# Patient Record
Sex: FEMALE | Race: WHITE | NOT HISPANIC OR LATINO | Employment: UNEMPLOYED | URBAN - METROPOLITAN AREA
[De-identification: names, ages, dates, MRNs, and addresses within clinical notes are randomized per-mention and may not be internally consistent; named-entity substitution may affect disease eponyms.]

---

## 2017-06-09 ENCOUNTER — APPOINTMENT (OUTPATIENT)
Dept: WOMENS IMAGING | Facility: HOSPITAL | Age: 57
End: 2017-06-09

## 2017-06-09 PROCEDURE — 77063 BREAST TOMOSYNTHESIS BI: CPT | Performed by: RADIOLOGY

## 2017-06-09 PROCEDURE — 77067 SCR MAMMO BI INCL CAD: CPT | Performed by: RADIOLOGY

## 2018-06-12 ENCOUNTER — APPOINTMENT (OUTPATIENT)
Dept: WOMENS IMAGING | Facility: HOSPITAL | Age: 58
End: 2018-06-12

## 2018-06-12 PROCEDURE — 77067 SCR MAMMO BI INCL CAD: CPT | Performed by: RADIOLOGY

## 2018-06-12 PROCEDURE — 77063 BREAST TOMOSYNTHESIS BI: CPT | Performed by: RADIOLOGY

## 2021-01-14 ENCOUNTER — APPOINTMENT (OUTPATIENT)
Dept: WOMENS IMAGING | Facility: HOSPITAL | Age: 61
End: 2021-01-14

## 2021-01-14 PROCEDURE — 77063 BREAST TOMOSYNTHESIS BI: CPT | Performed by: RADIOLOGY

## 2021-01-14 PROCEDURE — 77067 SCR MAMMO BI INCL CAD: CPT | Performed by: RADIOLOGY

## 2021-05-25 ENCOUNTER — OFFICE VISIT CONVERTED (OUTPATIENT)
Dept: FAMILY MEDICINE CLINIC | Age: 61
End: 2021-05-25
Attending: NURSE PRACTITIONER

## 2021-06-04 ENCOUNTER — HOSPITAL ENCOUNTER (OUTPATIENT)
Dept: OTHER | Facility: HOSPITAL | Age: 61
Discharge: HOME | End: 2021-06-04
Attending: NURSE PRACTITIONER

## 2021-06-04 LAB
25(OH)D3 SERPL-MCNC: 35.4 NG/ML (ref 30–100)
ALBUMIN SERPL-MCNC: 4.8 G/DL (ref 3.5–5)
ALBUMIN/GLOB SERPL: 1.6 {RATIO} (ref 1.4–2.6)
ALP SERPL-CCNC: 31 U/L (ref 53–141)
ALT SERPL-CCNC: 20 U/L (ref 10–40)
ANION GAP SERPL CALC-SCNC: 17 MMOL/L (ref 8–19)
AST SERPL-CCNC: 18 U/L (ref 15–50)
BASOPHILS # BLD MANUAL: 0.04 10*3/UL (ref 0–0.2)
BASOPHILS NFR BLD MANUAL: 0.7 % (ref 0–3)
BILIRUB SERPL-MCNC: 0.2 MG/DL (ref 0.2–1.3)
BUN SERPL-MCNC: 13 MG/DL (ref 5–25)
BUN/CREAT SERPL: 19 {RATIO} (ref 6–20)
CALCIUM SERPL-MCNC: 9.5 MG/DL (ref 8.7–10.4)
CHLORIDE SERPL-SCNC: 102 MMOL/L (ref 99–111)
CHOLEST SERPL-MCNC: 230 MG/DL (ref 107–200)
CHOLEST/HDLC SERPL: 3.5 {RATIO} (ref 3–6)
CONV CO2: 27 MMOL/L (ref 22–32)
CONV TOTAL PROTEIN: 7.8 G/DL (ref 6.3–8.2)
CREAT UR-MCNC: 0.68 MG/DL (ref 0.5–0.9)
DEPRECATED RDW RBC AUTO: 41.6 FL
EOSINOPHIL # BLD MANUAL: 0.13 10*3/UL (ref 0–0.7)
EOSINOPHIL NFR BLD MANUAL: 2.2 % (ref 0–7)
ERYTHROCYTE [DISTWIDTH] IN BLOOD BY AUTOMATED COUNT: 13.9 % (ref 11.5–14.5)
GFR SERPLBLD BASED ON 1.73 SQ M-ARVRAT: >60 ML/MIN/{1.73_M2}
GLOBULIN UR ELPH-MCNC: 3 G/DL (ref 2–3.5)
GLUCOSE SERPL-MCNC: 101 MG/DL (ref 65–99)
GRANS (ABSOLUTE): 3.22 10*3/UL (ref 2–8)
GRANS: 54.8 % (ref 30–85)
HBA1C MFR BLD: 12.9 G/DL (ref 12–16)
HCT VFR BLD AUTO: 40 % (ref 37–47)
HDLC SERPL-MCNC: 66 MG/DL (ref 40–60)
IMM GRANULOCYTES # BLD: 0.01 10*3/UL (ref 0–0.54)
IMM GRANULOCYTES NFR BLD: 0.2 % (ref 0–0.43)
LDLC SERPL CALC-MCNC: 144 MG/DL (ref 70–100)
LYMPHOCYTES # BLD MANUAL: 1.96 10*3/UL (ref 1–5)
LYMPHOCYTES NFR BLD MANUAL: 8.8 % (ref 3–10)
MCH RBC QN AUTO: 26.3 PG (ref 27–31)
MCHC RBC AUTO-ENTMCNC: 32.3 G/DL (ref 33–37)
MCV RBC AUTO: 81.5 FL (ref 81–99)
MONOCYTES # BLD AUTO: 0.52 10*3/UL (ref 0.2–1.2)
OSMOLALITY SERPL CALC.SUM OF ELEC: 294 MOSM/KG (ref 273–304)
PLATELET # BLD AUTO: 181 10*3/UL (ref 130–400)
PMV BLD AUTO: 11.6 FL (ref 7.4–10.4)
POTASSIUM SERPL-SCNC: 4 MMOL/L (ref 3.5–5.3)
RBC # BLD AUTO: 4.91 10*6/UL (ref 4.2–5.4)
SODIUM SERPL-SCNC: 142 MMOL/L (ref 135–147)
TRIGL SERPL-MCNC: 98 MG/DL (ref 40–150)
TSH SERPL-ACNC: 2.42 M[IU]/L (ref 0.27–4.2)
VARIANT LYMPHS NFR BLD MANUAL: 33.3 % (ref 20–45)
VIT B12 SERPL-MCNC: 431 PG/ML (ref 211–911)
VLDLC SERPL-MCNC: 20 MG/DL (ref 5–37)
WBC # BLD AUTO: 5.88 10*3/UL (ref 4.8–10.8)

## 2021-06-05 LAB — HCV AB SER DONR QL: <0.1 S/CO RATIO (ref 0–0.9)

## 2021-06-05 NOTE — PROGRESS NOTES
Mine Palm  1960     Office/Outpatient Visit    Visit Date: Tue, May 25, 2021 02:12 pm    Provider: Janet Canseco N.P. (Assistant: Desiree Scott MA)    Location: [facility]        Electronically signed by Janet Canseco N.P. on  05/25/2021 06:35:16 PM                             Subjective:        CC: Mine is a 60 year old female.  This is her first visit to the clinic.  med refills, establish care, lab work (not fasting);         HPI: moved here from [place] recently but has not been to PCP for some time          PHQ-9 Depression Screening: Completed form scanned and in chart; Total Score 6           Complaint of vitamin D deficiency, unspecified..  The symptom began years ago.  The severity is of moderate intensity.  previous Vitamin D deficieny taking daily supplement  has not had values checked for over a year           Concerning irritable bowel syndrome with constipation, had colon and EGD / CT abdomen 5 years ago  -   Has an appt in June with Gastro for follow up   [place]  Symptoms come and go and wax and wane in severity.  She does take fiber supplement she does take IB Tio PRN which seems to help  has tried multiple dietary chagnes over the years  no improvement in her symptoms           Complaint of menopausal and female climacteric states..  The symptom began years ago.  currently taking hormone replacement           Complaint of herpesviral vesicular dermatitis..  will get recurrent cold sores  generally likes to have acyclovir on hand in case develops     ROS:     CONSTITUTIONAL:  Negative for chills, fatigue and fever.      CARDIOVASCULAR:  Negative for chest pain and pedal edema.      RESPIRATORY:  Negative for recent cough and dyspnea.      GASTROINTESTINAL:  Positive for abdominal pain ( upper abdomen more bloating / discomfort ), acid reflux symptoms, abdominal bloating, constipation and belching.   Negative for diarrhea, heartburn, nausea or  vomiting.      MUSCULOSKELETAL:  Negative for arthralgias and myalgias.      INTEGUMENTARY/BREAST:  Negative for pruritis and rash.      NEUROLOGICAL:  Negative for dizziness, fainting, headaches and paresthesias.      ENDOCRINE:  Negative for hair loss, polydipsia and polyphagia.      ALLERGIC/IMMUNOLOGIC:  Negative for seasonal allergies.      PSYCHIATRIC:  Negative for anxiety, depression and suicidal thoughts.          Past Medical History / Family History / Social History:         Past Medical History:             PREVENTIVE HEALTH MAINTENANCE             BONE DENSITY: was last done      COLORECTAL CANCER SCREENING: Up to date (colonoscopy q10y; sigmoidoscopy q5y; Cologuard q3y) was last done   [facility]  Dr. Cornell  - Normal     DENTAL CLEANING: was last done   [place]     EYE EXAM: was last done  right eye  - small retinal sclerosis  follow up in 6 months - [place]     Hepatitis C Medicare Screening: unsure     MAMMOGRAM: was last done   Dr. Bakre   Imaging across from [facility]         Surgical History:         Hysterectomy: at age 29 - fibroid tumor / endometriosis   Total with BLSO;     bilateral breast reduction;    adhesions removal   - ;      section: X 1;         Family History:     Father:  at age 64; Cause of death was CVA;  cerebrovascular accident     Mother:  at age 82;  congestive heart failure;  COPD;  hypothyroidism     Brother(s): coronary artery disease (in 50s or 60s open heart)     Sister(s): transient ischemic attack         Social History:     Occupation: Unemployed     Marital Status:      Children: 1 child         Tobacco/Alcohol/Supplements:     Tobacco: She has never smoked.          Alcohol: Frequency: Socially     Caffeine:  She admits to consuming caffeine via coffee.          Substance Abuse History:     None         Mental Health History:     NEGATIVE         Communicable Diseases (eg STDs):     Reportable health conditions;  NEGATIVE         Current Problems:     Herpesviral vesicular dermatitis    Vitamin D deficiency, unspecified    Irritable bowel syndrome with constipation    Menopausal and female climacteric states    Other fatigue    Encounter for screening for other viral diseases    Encounter for screening for depression    Encounter for screening for cardiovascular disorders        Immunizations:     influenza, injectable, quadrivalent, preservative free 10/8/2020    SARS-COV-2 (COVID-19) vaccine, UNSPECIFIED 3/13/2021    SARS-COV-2 (COVID-19) vaccine, UNSPECIFIED 4/3/2021        Allergies:     Last Reviewed on 5/25/2021 02:18 PM by Desiree Scott    No Known Allergies.        Current Medications:     Last Reviewed on 5/25/2021 02:18 PM by Desiree Scott    Vitamin D3 10 mcg (400 unit) oral capsule    calcium carbonate 300 mg (750 mg) oral tablet,chewable    Vitamin C     ACYCLOVIR 400MG TABLETS  [TK 1 T PO Q 8 H FOR 5 DAYS]    PROGESTERONE MICRO 100MG CAPSULES  [TAKE 1 CAPSULE BY MOUTH EVERY NIGHT AT BEDTIME]    ESTRADIOL 0.0375MG PATCH (TWICE WK)  [APPLY 1 PATCH EXTERNALLY TO THE SKIN 2 TIMES A WEEK]        Objective:        Vitals:         Current: 5/25/2021 2:23:56 PM    Ht:  5 ft, 0 in;  Wt: 196 lbs;  BMI: 38.3T: 97.4 F (temporal);  BP: 132/62 mm Hg (left arm, sitting);  P: 74 bpm (left arm (BP Cuff), sitting)        Exams:     PHYSICAL EXAM:     GENERAL: vital signs recorded - well developed, well nourished, obese;  no apparent distress;     NECK: thyroid is non-palpable;     RESPIRATORY: normal appearance and symmetric expansion of chest wall; normal respiratory rate and pattern with no distress; normal breath sounds with no rales, rhonchi, wheezes or rubs;     CARDIOVASCULAR: normal rate; rhythm is regular;  no edema;     GASTROINTESTINAL: nontender; normal bowel sounds; no organomegaly;     MUSCULOSKELETAL: normal gait; normal range of motion of all major muscle groups; no limb or joint pain with range of  motion;     NEUROLOGIC: mental status: alert and oriented x 3;     PSYCHIATRIC: appropriate affect and demeanor; normal speech pattern; normal thought and perception;         Assessment:         K58.1   Irritable bowel syndrome with constipation       E55.9   Vitamin D deficiency, unspecified       R53.83   Other fatigue       N95.1   Menopausal and female climacteric states       B00.1   Herpesviral vesicular dermatitis       Z13.31   Encounter for screening for depression       Z13.6   Encounter for screening for cardiovascular disorders       Z11.59   Encounter for screening for other viral diseases           ORDERS:         Meds Prescribed:       [New Rx] acyclovir 400 mg oral tablet [take 1 tablet (400 mg) by oral route q 8 hours x 5 days prn at onset of symptoms ], #20 (twenty) tablets, Refills: 5 (five)         Lab Orders:       15321  VITD - [facility] Vitamin D, 25 Hydroxy  (Send-Out)            17553  VB12 - [facility] Vitamin B12  (Send-Out)            80346  [facility]PCAB - [facility] Hepatitis C antibody  (Send-Out)            51243  PHYSF - [facility] PHYSICAL: CMP, CBC, TSH, LIPID: [id number], [id number], [id number], [id number]  (Send-Out)              Other Orders:         Depression screen negative  (In-House)                      Plan:         Irritable bowel syndrome with constipationKeep appt with Gastro and have records forwarded to our office         Vitamin D deficiency, unspecifiedcontinue current treatment and will check levels     LABORATORY:  Labs ordered to be performed today include Vitamin D.            Orders:       [id number]  VITD - [facility] Vitamin D, 25 Hydroxy  (Send-Out)              Other fatigue    LABORATORY:  Labs ordered to be performed today include B12.            Orders:       60804  VB12 - [facility] Vitamin B12  (Send-Out)              Menopausal and female climacteric statesI have discussed with her recommendations are generally based on CVD risk  we will check her labs and see if we feel she should continue vs alternative and  determine risks         Herpesviral vesicular dermatitiswill refill medication for her to have on hand for PRN use         Encounter for screening for depression    MIPS PHQ-9 Depression Screening: Completed form scanned and in chart; Total Score 6; Positive Depression Screen but after further evaluation the patient does not have a diagnosis of depression.            Orders:         Depression screen negative  (In-House)              Encounter for screening for cardiovascular disorders    LABORATORY:  Labs ordered to be performed today include PHYSICAL PANEL; CMP, CBC, TSH, LIPID.            Orders:       [id number]  [facility] PHYSICAL: CMP, CBC, TSH, LIPID: [id number], [id number], [id number]  (Send-Out)              Encounter for screening for other viral diseases    LABORATORY:  Labs ordered to be performed today include Hepatitis C Antibody.            Orders:       [id number]  [facility] Hepatitis C antibody  (Send-Out)                  Other Prescriptions:       [New Rx] acyclovir 400 mg oral tablet [take 1 tablet (400 mg) by oral route q 8 hours x 5 days prn at onset of symptoms ], #20 (twenty) tablets, Refills: 5 (five)         Charge Capture:         Primary Diagnosis:     K58.1  Irritable bowel syndrome with constipation           Orders:      99661  Office visit - new pt, level 3  (In-House)              E55.9  Vitamin D deficiency, unspecified     R53.83  Other fatigue     N95.1  Menopausal and female climacteric states     B00.1  Herpesviral vesicular dermatitis     Z13.31  Encounter for screening for depression           Orders:        Depression screen negative  (In-House)              Z13.6  Encounter for screening for cardiovascular disorders     Z11.59  Encounter for screening for other viral diseases         ADDENDUMS:      ____________________________________    Addendum: 05/28/2021 02:45 PM - Janet Canseco        ADDENDUM: Add [id number]; Remove [id number]

## 2021-06-17 ENCOUNTER — OFFICE VISIT (OUTPATIENT)
Dept: GASTROENTEROLOGY | Facility: CLINIC | Age: 61
End: 2021-06-17

## 2021-06-17 VITALS
BODY MASS INDEX: 38.09 KG/M2 | HEIGHT: 60 IN | WEIGHT: 194 LBS | SYSTOLIC BLOOD PRESSURE: 130 MMHG | DIASTOLIC BLOOD PRESSURE: 70 MMHG

## 2021-06-17 DIAGNOSIS — K76.0 HEPATIC STEATOSIS: Chronic | ICD-10-CM

## 2021-06-17 DIAGNOSIS — R14.2 BELCHING: Chronic | ICD-10-CM

## 2021-06-17 DIAGNOSIS — R10.13 DYSPEPSIA: Primary | Chronic | ICD-10-CM

## 2021-06-17 DIAGNOSIS — R14.0 BLOATING: Chronic | ICD-10-CM

## 2021-06-17 DIAGNOSIS — K59.09 OTHER CONSTIPATION: Chronic | ICD-10-CM

## 2021-06-17 PROCEDURE — 99204 OFFICE O/P NEW MOD 45 MIN: CPT | Performed by: INTERNAL MEDICINE

## 2021-06-17 RX ORDER — PROGESTERONE 100 MG/1
100 CAPSULE ORAL
COMMUNITY
Start: 2021-04-21

## 2021-06-17 RX ORDER — OMEPRAZOLE 20 MG/1
20 CAPSULE, DELAYED RELEASE ORAL DAILY
COMMUNITY
End: 2021-06-17

## 2021-06-17 RX ORDER — PANTOPRAZOLE SODIUM 20 MG/1
20 TABLET, DELAYED RELEASE ORAL
Qty: 30 TABLET | Refills: 3 | Status: SHIPPED | OUTPATIENT
Start: 2021-06-17 | End: 2021-11-24 | Stop reason: SDUPTHER

## 2021-06-17 RX ORDER — ESTRADIOL 0.04 MG/D
1 FILM, EXTENDED RELEASE TRANSDERMAL 2 TIMES WEEKLY
COMMUNITY
Start: 2021-04-22 | End: 2023-01-09 | Stop reason: ALTCHOICE

## 2021-06-17 NOTE — PROGRESS NOTES
"Chief Complaint   Patient presents with   • Irritable Bowel Syndrome   • Belching       Subjective     HPI    Mine Palm is a 60 y.o. female with a past medical history noted below who presents for IBS and belching.  She reports symptoms for about 5 years.  Describes belching, indigestion, lots of \"churning\" in her lower abdomen.  She saw Dr Rogers in 2017.  Had scopes 1/2017. She was diagnosed with IBS, treated witih omeprazole and bentyl but doesn't think they helped too much.    Lactose intolerant.  Struggles with beans, cruciferous veggies, processed deli meat-- \"tears me up.\"    Denies diarrhea.  If anything, more constipated, irregular BMs, feels incomplete evacuation.  Takes 1 fiber pill daily.      Negative celiac testing    Feels belching is excessive.  Having dyspepsia, bloating, mid-abdomen burning intermittently, depends on certain foods.  Taking otc omeprazole but doesn't feel that it helps.      No family history of GI malignancies    Hx of hysterectomy and  c section    Previously worked in [employer], not currently working    No smoking, social ETOH    Today's visit was in the office.  Both the patient and I were wearing face masks and proper hand hygiene was performed before and after the physical exam.           Current Outpatient Medications:   •  Ascorbic Acid (VITAMIN C PO), Take  by mouth Daily., Disp: , Rfl:   •  estradiol (VIVELLE-DOT) 0.0375 MG/24HR patch, 1 patch 2 (Two) Times a Week., Disp: , Rfl:   •  Progesterone (PROMETRIUM) 100 MG capsule, Take 100 mg by mouth every night at bedtime., Disp: , Rfl:   •  vitamin D3 125 MCG (5000 UT) capsule capsule, Take 5,000 Units by mouth Daily., Disp: , Rfl:   •  pantoprazole (PROTONIX) 20 MG EC tablet, Take 1 tablet by mouth Every Morning Before Breakfast., Disp: 30 tablet, Rfl: 3      Objective     Vitals:    06/17/21 0911   BP: 130/70         06/17/21 0911   Weight: 88 kg (194 lb)     Body mass index is 37.89 kg/m².    Physical Exam  Vitals " reviewed.   Constitutional:       General: She is not in acute distress.     Appearance: Normal appearance. She is well-developed. She is obese. She is not diaphoretic.   HENT:      Head: Normocephalic.   Neck:      Thyroid: No thyromegaly.   Cardiovascular:      Rate and Rhythm: Normal rate and regular rhythm.   Pulmonary:      Effort: Pulmonary effort is normal. No respiratory distress.      Breath sounds: Normal breath sounds. No wheezing.   Abdominal:      General: Bowel sounds are normal. There is no distension.      Palpations: Abdomen is soft. Abdomen is not rigid. There is no mass.      Tenderness: There is no abdominal tenderness. There is no guarding or rebound.      Hernia: No hernia is present.   Genitourinary:     Comments: Rectal exam deferred  Musculoskeletal:         General: No tenderness.   Neurological:      Mental Status: She is alert and oriented to person, place, and time.      Motor: No atrophy.      Coordination: Coordination normal.   Psychiatric:         Behavior: Behavior normal.         Thought Content: Thought content normal.         Judgment: Judgment normal.             WBC   Date Value Ref Range Status   06/04/2021 5.88 4.80 - 10.80 10*3/uL Final   02/13/2019 5.52 4.5 - 11.0 10*3/uL Final     RBC   Date Value Ref Range Status   06/04/2021 4.91 4.20 - 5.40 10*6/uL Final   02/13/2019 5.24 (H) 4.0 - 5.2 10*6/uL Final     Hemoglobin   Date Value Ref Range Status   02/13/2019 14.0 12.0 - 16.0 g/dL Final     Hgb   Date Value Ref Range Status   06/04/2021 12.90 12.00 - 16.00 g/dL Final     Hematocrit   Date Value Ref Range Status   06/04/2021 40.0 37.0 - 47.0 % Final   02/13/2019 42.9 36.0 - 46.0 % Final     MCV   Date Value Ref Range Status   06/04/2021 81.5 81.0 - 99.0 fL Final   02/13/2019 81.9 80.0 - 100.0 fL Final     MCH   Date Value Ref Range Status   06/04/2021 26.3 (L) 27.0 - 31.0 pg Final   02/13/2019 26.7 26.0 - 34.0 pg Final     MCHC   Date Value Ref Range Status   06/04/2021  32.3 (L) 33.0 - 37.0 Final   02/13/2019 32.6 31.0 - 37.0 g/dL Final     RDW   Date Value Ref Range Status   06/04/2021 13.9 11.5 - 14.5 % Final   02/13/2019 13.5 12.0 - 16.8 % Final     RDW-SD   Date Value Ref Range Status   06/04/2021 41.6 NA Final     MPV   Date Value Ref Range Status   06/04/2021 11.6 (H) 7.4 - 10.4 fL Final     Comment:     Testing performed by:  [facility]  CLIA#[id number]  [address] [facility]  [place] [address]     02/13/2019 12.0 (H) 6.7 - 10.8 fL Final     Platelets   Date Value Ref Range Status   06/04/2021 181.00 130.00 - 400.00 10*3/uL Final   02/13/2019 173 140 - 440 10*3/uL Final     Neutrophil Rel %   Date Value Ref Range Status   02/13/2019 56.3 45 - 80 % Final     Lymphocyte Rel %   Date Value Ref Range Status   02/13/2019 33.0 15 - 50 % Final     Monocyte Rel %   Date Value Ref Range Status   02/13/2019 7.6 0 - 15 % Final     Eosinophil %   Date Value Ref Range Status   02/13/2019 2.2 0 - 7 % Final     Basophil Rel %   Date Value Ref Range Status   02/13/2019 0.9 0 - 2 % Final     Immature Grans %   Date Value Ref Range Status   02/13/2019 0.0 0 % Final     Neutrophils Absolute   Date Value Ref Range Status   02/13/2019 3.11 2.0 - 8.8 10*3/uL Final     Lymphocytes Absolute   Date Value Ref Range Status   02/13/2019 1.82 0.7 - 5.5 10*3/uL Final     Monocytes Absolute   Date Value Ref Range Status   06/04/2021 0.52 0.20 - 1.20 10*3/uL Final   02/13/2019 0.42 0.0 - 1.7 10*3/uL Final     Eosinophils Absolute   Date Value Ref Range Status   06/04/2021 0.13 0.00 - 0.70 10*3/uL Final   02/13/2019 0.12 0.0 - 0.8 10*3/uL Final     Basophils Absolute   Date Value Ref Range Status   06/04/2021 0.04 0.00 - 0.20 10*3/uL Final   02/13/2019 0.05 0.0 - 0.2 10*3/uL Final     Immature Grans, Absolute   Date Value Ref Range Status   02/13/2019 0.00 <1 10*3/uL Final     nRBC   Date Value Ref Range Status   02/13/2019 0 0 /100(WBC) Final       Lab Results   Component  Value Date    BUN 13 06/04/2021    CREATININE 0.68 06/04/2021    BCR 19 06/04/2021    CO2 27 06/04/2021    CALCIUM 9.5 06/04/2021    ALBUMIN 4.8 06/04/2021    LABIL2 1.6 06/04/2021    AST 18 06/04/2021    ALT 20 06/04/2021         US ABDOMEN LIMITED     DATE: 01/10/2017     HISTORY: Elevated LFT's.     FINDINGS: Limited right upper quadrant ultrasound performed, which shows diffuse hepatic increased echogenicity, but there is no focal mass or biliary ductal dilatation. The gallbladder is normal and free of stones, and the common bile duct is of normal   diameter at 3 mm. Visualized portions of the right kidney and pancreas are normal. There is no free peritoneal fluid.     IMPRESSION:   1. Diffuse hepatic fatty infiltration. No other abnormality identified.     Dictated by: David Ledezma M.D.     Images and Report reviewed and interpreted by: David Ledezma M.D.       I personally reviewed data as detailed below:     The labs listed above.    The radiology studies listed above.    Office notes from: 3/5/19 and 2/2018 Dr Rogers GI notes    Endoscopy procedures and pathology from: 1/2017 EGD and colonoscopy; notable for L sided diverticulosis      No notes on file    Assessment/Plan     Diagnoses and all orders for this visit:    1. Dyspepsia (Primary)    2. Other constipation    3. Belching    4. Bloating    5. Hepatic steatosis    Other orders  -     pantoprazole (PROTONIX) 20 MG EC tablet; Take 1 tablet by mouth Every Morning Before Breakfast.  Dispense: 30 tablet; Refill: 3    Plan  This is a 60-year-old woman with multiple chronic GI issues.  I suspect the biggest underlying issue is her constipation.  I have encouraged her to use MiraLAX which she has at home.  We discussed that she should adjust the dosing and that it can make the stools softer in consistency.  I would also like to try her on pantoprazole to help with her dyspepsia symptoms  Encouraged her to continue the fiber but to make sure  she is getting the correct dosing of this because it sounds like she is underdosed on the amount she should take daily  If she is not improving with the PPI change and constipation, will look to repeat scope test    I have discussed the above plan with the patient.  They verbalize understanding and are in agreement with the plan.  They have been advised to contact the office for any questions, concerns, or changes related to their health.    Dictated utilizing Dragon dictation

## 2021-06-21 ENCOUNTER — TELEPHONE (OUTPATIENT)
Dept: FAMILY MEDICINE CLINIC | Age: 61
End: 2021-06-21

## 2021-06-21 NOTE — TELEPHONE ENCOUNTER
Caller: Mine Palm    Relationship to patient: Self    Best call back number:[phone]     Chief complaint: HURT RIGHT HAND, SWOLLEN, WEAK, LIMITED RANGE OF MOTION, PAIN    Type of visit: SAME DAY    Requested date: 06/21/2021      Additional notes:PATIENT REPORTS FALLING ON 06/19/2021.

## 2021-07-02 VITALS
TEMPERATURE: 97.4 F | HEART RATE: 74 BPM | WEIGHT: 196 LBS | BODY MASS INDEX: 38.48 KG/M2 | SYSTOLIC BLOOD PRESSURE: 132 MMHG | DIASTOLIC BLOOD PRESSURE: 62 MMHG | HEIGHT: 60 IN

## 2021-11-02 ENCOUNTER — TELEPHONE (OUTPATIENT)
Dept: GASTROENTEROLOGY | Facility: CLINIC | Age: 61
End: 2021-11-02

## 2021-11-02 ENCOUNTER — OFFICE VISIT (OUTPATIENT)
Dept: GASTROENTEROLOGY | Facility: CLINIC | Age: 61
End: 2021-11-02

## 2021-11-02 VITALS
BODY MASS INDEX: 38.4 KG/M2 | SYSTOLIC BLOOD PRESSURE: 136 MMHG | DIASTOLIC BLOOD PRESSURE: 72 MMHG | HEIGHT: 60 IN | WEIGHT: 195.6 LBS

## 2021-11-02 DIAGNOSIS — K76.0 HEPATIC STEATOSIS: ICD-10-CM

## 2021-11-02 DIAGNOSIS — R10.10 PAIN OF UPPER ABDOMEN: Chronic | ICD-10-CM

## 2021-11-02 DIAGNOSIS — R10.13 DYSPEPSIA: Primary | Chronic | ICD-10-CM

## 2021-11-02 DIAGNOSIS — R14.0 BLOATING: Chronic | ICD-10-CM

## 2021-11-02 DIAGNOSIS — K59.09 OTHER CONSTIPATION: Chronic | ICD-10-CM

## 2021-11-02 DIAGNOSIS — R14.2 BELCHING: Chronic | ICD-10-CM

## 2021-11-02 PROCEDURE — 99214 OFFICE O/P EST MOD 30 MIN: CPT | Performed by: INTERNAL MEDICINE

## 2021-11-02 RX ORDER — SODIUM CHLORIDE, SODIUM LACTATE, POTASSIUM CHLORIDE, CALCIUM CHLORIDE 600; 310; 30; 20 MG/100ML; MG/100ML; MG/100ML; MG/100ML
30 INJECTION, SOLUTION INTRAVENOUS CONTINUOUS
Status: CANCELLED | OUTPATIENT
Start: 2022-01-04

## 2021-11-02 RX ORDER — FAMOTIDINE 20 MG/1
20 TABLET, FILM COATED ORAL NIGHTLY
Qty: 30 TABLET | Refills: 0 | Status: SHIPPED | OUTPATIENT
Start: 2021-11-02 | End: 2022-02-01

## 2021-11-02 NOTE — TELEPHONE ENCOUNTER
HEIDI patient in office for EGD/CS.  Scheduled on  01/04/2022 with arrival time of 9:00am . Prep packet given to patient in office. Patient also advised that his/her arrival time may fluctuate due to [facility] guidelines. HEIDI Hooper--[place] .

## 2021-11-02 NOTE — PROGRESS NOTES
"Chief Complaint   Patient presents with   • Dyspepsia   • Constipation   • Bloated   • Belching       Subjective     HPI    Mine Palm is a 60 y.o. female with a past medical history noted below who presents for follow-up of multiple chronic GI issues including constipation, dyspepsia, hepatic steatosis.    PPI has helped her belching.  However, she still doesn't feel quite right.      Feels like something is \"stuck\" in the right side of her abdomen    Raises up and burps a lot at night and that is interrupting her sleep.  She feels that is making her not feel well due to interrupting her sleep.    Stools are hard to push out.  Stools are initially hard and then softer.  Taking Metamucil, sometimes 2 capsules sometimes 4 in a day.  Not regular with this.  Taking MiraLAX as needed.  She feels that it sort of helps some.  She did recently see some blood in her stool, she thinks this was from straining.    Takes pantoprazole every morning.      Today's visit was in the office.  Both the patient and I were wearing face masks and proper hand hygiene was performed before and after the physical exam.           Current Outpatient Medications:   •  Ascorbic Acid (VITAMIN C PO), Take  by mouth Daily., Disp: , Rfl:   •  estradiol (VIVELLE-DOT) 0.0375 MG/24HR patch, 1 patch 2 (Two) Times a Week., Disp: , Rfl:   •  pantoprazole (PROTONIX) 20 MG EC tablet, Take 1 tablet by mouth Every Morning Before Breakfast., Disp: 30 tablet, Rfl: 3  •  Progesterone (PROMETRIUM) 100 MG capsule, Take 100 mg by mouth every night at bedtime., Disp: , Rfl:   •  vitamin D3 125 MCG (5000 UT) capsule capsule, Take 5,000 Units by mouth Daily., Disp: , Rfl:   •  famotidine (Pepcid) 20 MG tablet, Take 1 tablet by mouth Every Night., Disp: 30 tablet, Rfl: 0      Objective     Vitals:    11/02/21 1316   BP: 136/72         11/02/21  1316   Weight: 88.7 kg (195 lb 9.6 oz)     Body mass index is 38.2 kg/m².    Physical Exam  Constitutional:       " General: She is not in acute distress.     Appearance: She is obese.   Pulmonary:      Effort: Pulmonary effort is normal.   Abdominal:      Palpations: Abdomen is soft.      Tenderness: There is no abdominal tenderness.   Neurological:      Mental Status: She is alert and oriented to person, place, and time.   Psychiatric:         Mood and Affect: Mood normal.         Behavior: Behavior normal.         Thought Content: Thought content normal.         Judgment: Judgment normal.             WBC   Date Value Ref Range Status   06/04/2021 5.88 4.80 - 10.80 10*3/uL Final   02/13/2019 5.52 4.5 - 11.0 10*3/uL Final     RBC   Date Value Ref Range Status   06/04/2021 4.91 4.20 - 5.40 10*6/uL Final   02/13/2019 5.24 (H) 4.0 - 5.2 10*6/uL Final     Hemoglobin   Date Value Ref Range Status   02/13/2019 14.0 12.0 - 16.0 g/dL Final     Hgb   Date Value Ref Range Status   06/04/2021 12.90 12.00 - 16.00 g/dL Final     Hematocrit   Date Value Ref Range Status   06/04/2021 40.0 37.0 - 47.0 % Final   02/13/2019 42.9 36.0 - 46.0 % Final     MCV   Date Value Ref Range Status   06/04/2021 81.5 81.0 - 99.0 fL Final   02/13/2019 81.9 80.0 - 100.0 fL Final     MCH   Date Value Ref Range Status   06/04/2021 26.3 (L) 27.0 - 31.0 pg Final   02/13/2019 26.7 26.0 - 34.0 pg Final     MCHC   Date Value Ref Range Status   06/04/2021 32.3 (L) 33.0 - 37.0 Final   02/13/2019 32.6 31.0 - 37.0 g/dL Final     RDW   Date Value Ref Range Status   06/04/2021 13.9 11.5 - 14.5 % Final   02/13/2019 13.5 12.0 - 16.8 % Final     RDW-SD   Date Value Ref Range Status   06/04/2021 41.6 NA Final     MPV   Date Value Ref Range Status   06/04/2021 11.6 (H) 7.4 - 10.4 fL Final     Comment:     Testing performed by:  [facility]  CLIA#[id number]  [address]  [place] [address]     02/13/2019 12.0 (H) 6.7 - 10.8 fL Final     Platelets   Date Value Ref Range Status   06/04/2021 181.00 130.00 - 400.00 10*3/uL Final   02/13/2019  173 140 - 440 10*3/uL Final     Neutrophil Rel %   Date Value Ref Range Status   02/13/2019 56.3 45 - 80 % Final     Lymphocyte Rel %   Date Value Ref Range Status   02/13/2019 33.0 15 - 50 % Final     Monocyte Rel %   Date Value Ref Range Status   02/13/2019 7.6 0 - 15 % Final     Eosinophil %   Date Value Ref Range Status   02/13/2019 2.2 0 - 7 % Final     Basophil Rel %   Date Value Ref Range Status   02/13/2019 0.9 0 - 2 % Final     Immature Grans %   Date Value Ref Range Status   02/13/2019 0.0 0 % Final     Neutrophils Absolute   Date Value Ref Range Status   02/13/2019 3.11 2.0 - 8.8 10*3/uL Final     Lymphocytes Absolute   Date Value Ref Range Status   02/13/2019 1.82 0.7 - 5.5 10*3/uL Final     Monocytes Absolute   Date Value Ref Range Status   06/04/2021 0.52 0.20 - 1.20 10*3/uL Final   02/13/2019 0.42 0.0 - 1.7 10*3/uL Final     Eosinophils Absolute   Date Value Ref Range Status   06/04/2021 0.13 0.00 - 0.70 10*3/uL Final   02/13/2019 0.12 0.0 - 0.8 10*3/uL Final     Basophils Absolute   Date Value Ref Range Status   06/04/2021 0.04 0.00 - 0.20 10*3/uL Final   02/13/2019 0.05 0.0 - 0.2 10*3/uL Final     Immature Grans, Absolute   Date Value Ref Range Status   02/13/2019 0.00 <1 10*3/uL Final     nRBC   Date Value Ref Range Status   02/13/2019 0 0 /100(WBC) Final       Lab Results   Component Value Date    GLUCOSE 101 (H) 06/04/2021    BUN 13 06/04/2021    CREATININE 0.68 06/04/2021    BCR 19 06/04/2021    CO2 27 06/04/2021    CALCIUM 9.5 06/04/2021    ALBUMIN 4.8 06/04/2021    LABIL2 1.6 06/04/2021    AST 18 06/04/2021    ALT 20 06/04/2021         Imaging Results (Last 7 Days)     ** No results found for the last 168 hours. **              No notes on file    Assessment/Plan    1.  Dyspepsia: Chronic symptom.  She has gotten some improvement with PPI but she is quite convinced that something is wrong    2.  Upper abdominal pain: As above    3.  Chronic constipation: She is really not taking the MiraLAX or  Metamucil as directed.  I think this will help quite a bit    4.  Belching: Some improvement with PPI but reports it is interfering with her sleep at night and making her feel poorly    5.  Hepatic steatosis: As per imaging, normal transaminases back in June.  She is obese    Plan    She is quite worried and quite convinced that something is wrong.  Her last work-up was in 2017 with Dr. Rogers.  She is wanting to update her scope tests    In the meantime, I would like for her to continue the daily pantoprazole  Add in nightly Pepcid  Encouraged her to take the full dose of Metamucil daily  Daily MiraLAX  Trial of gluten-free to see if this impacts her symptoms  We discussed that if there are no significant findings on her endoscopy that she is really going to have to double down on her diet for both weight loss, FODMAPs    Diagnoses and all orders for this visit:    1. Dyspepsia (Primary)  -     Case Request; Standing  -     COVID PRE-OP / PRE-PROCEDURE SCREENING ORDER (NO ISOLATION) - Swab, Nasopharynx; Future  -     Follow Anesthesia Guidelines / Standing Orders; Future  -     Obtain Informed Consent; Future  -     Case Request    2. Other constipation  -     Case Request; Standing  -     COVID PRE-OP / PRE-PROCEDURE SCREENING ORDER (NO ISOLATION) - Swab, Nasopharynx; Future  -     Follow Anesthesia Guidelines / Standing Orders; Future  -     Obtain Informed Consent; Future  -     Case Request    3. Pain of upper abdomen  -     Case Request; Standing  -     COVID PRE-OP / PRE-PROCEDURE SCREENING ORDER (NO ISOLATION) - Swab, Nasopharynx; Future  -     Follow Anesthesia Guidelines / Standing Orders; Future  -     Obtain Informed Consent; Future  -     Case Request    4. Belching    5. Bloating    6. Hepatic steatosis    Other orders  -     famotidine (Pepcid) 20 MG tablet; Take 1 tablet by mouth Every Night.  Dispense: 30 tablet; Refill: 0        I have discussed the above plan with the patient.  They verbalize  understanding and are in agreement with the plan.  They have been advised to contact the office for any questions, concerns, or changes related to their health.    Dictated utilizing Dragon dictation

## 2021-11-24 ENCOUNTER — TELEPHONE (OUTPATIENT)
Dept: GASTROENTEROLOGY | Facility: CLINIC | Age: 61
End: 2021-11-24

## 2021-11-24 RX ORDER — PANTOPRAZOLE SODIUM 20 MG/1
20 TABLET, DELAYED RELEASE ORAL
Qty: 30 TABLET | Refills: 1 | Status: SHIPPED | OUTPATIENT
Start: 2021-11-24 | End: 2022-02-01

## 2021-11-24 NOTE — TELEPHONE ENCOUNTER
"Faxed request received from [facility] for pantoprazole 20 mg - take 1 tab by mouth every morning before breakfast, #30.     See note of 11/2/21: \"In the meantime, I would like for her to continue the daily pantoprazole\"    Esribe completed as requested, R1 (egd and c/s scheduled for 1/4/22).    "

## 2021-12-16 ENCOUNTER — OFFICE VISIT (OUTPATIENT)
Dept: ORTHOPEDIC SURGERY | Facility: CLINIC | Age: 61
End: 2021-12-16

## 2021-12-16 VITALS — WEIGHT: 195 LBS | HEIGHT: 60 IN | BODY MASS INDEX: 38.28 KG/M2

## 2021-12-16 DIAGNOSIS — M17.10 PATELLOFEMORAL ARTHRITIS: ICD-10-CM

## 2021-12-16 DIAGNOSIS — M25.562 PAIN IN BOTH KNEES, UNSPECIFIED CHRONICITY: Primary | ICD-10-CM

## 2021-12-16 DIAGNOSIS — M25.561 PAIN IN BOTH KNEES, UNSPECIFIED CHRONICITY: Primary | ICD-10-CM

## 2021-12-16 PROCEDURE — 99203 OFFICE O/P NEW LOW 30 MIN: CPT | Performed by: ORTHOPAEDIC SURGERY

## 2021-12-16 RX ORDER — MELOXICAM 15 MG/1
15 TABLET ORAL DAILY
Qty: 30 TABLET | Refills: 1 | Status: SHIPPED | OUTPATIENT
Start: 2021-12-16 | End: 2022-05-17

## 2021-12-16 NOTE — PROGRESS NOTES
"Chief Complaint  Initial Evaluation of the Right Knee and Initial Evaluation of the Left Knee     Subjective      Mine Palm presents to [facility] for an evaluation of bilateral knees. Patient has had bilateral knee pain for several years. She has tried “babying” her knees over the years with little effectiveness with time. She states she has begun having pain on the lateral aspect of both knees. She states she takes Advil as needed for pain. She has started low impact exercises to lose weight and see if this would provide her with relief. However, the exercises seem to have worsened the pain. Patient reports difficulty with sitting sideways or kneeling down on her knees. Today, left knee is worse than the right.     No Known Allergies     Social History     Socioeconomic History   • Marital status:    Tobacco Use   • Smoking status: Never Smoker   • Smokeless tobacco: Never Used   Vaping Use   • Vaping Use: Never used   Substance and Sexual Activity   • Alcohol use: Yes     Comment: Social   • Drug use: Never        Review of Systems     Objective   Vital Signs:   Ht 152.4 cm (60\")   Wt 88.5 kg (195 lb)   BMI 38.08 kg/m²       Physical Exam  Constitutional:       Appearance: Normal appearance. Patient is well-developed and normal weight.   HENT:      Head: Normocephalic.      Right Ear: Hearing and external ear normal.      Left Ear: Hearing and external ear normal.      Nose: Nose normal.   Eyes:      Conjunctiva/sclera: Conjunctivae normal.   Cardiovascular:      Rate and Rhythm: Normal rate.   Pulmonary:      Effort: Pulmonary effort is normal.      Breath sounds: No wheezing or rales.   Abdominal:      Palpations: Abdomen is soft.      Tenderness: There is no abdominal tenderness.   Musculoskeletal:      Cervical back: Normal range of motion.   Skin:     Findings: No rash.   Neurological:      Mental Status: Patient is alert and oriented to person, place, and " time.   Psychiatric:         Mood and Affect: Mood and affect normal.         Judgment: Judgment normal.       Ortho Exam      RIGHT KNEE: Mildly limping gait. Good strength to hamstrings, quadriceps, dorsiflexors and plantar flexors. Stable to varus/valgus stress. Negative Lachman. Full extension. No swelling, skin discoloration or atrophy. Crepitus with knee range of motion. Dorsal Pedal Pulse 2+, posterior tibialis pulse 2+. Calf supple, non-tender, no signs of DVT.     LEFT KNEE: Flexion to 120 degrees. Crepitus with knee range of motion. Full extension. Good strength to hamstrings, quadriceps, dorsiflexors and plantar flexors. Stable to varus/valgus stress. Negative Lachman. Dorsal Pedal Pulse 2+, posterior tibialis pulse 2+. Sensation grossly intact. Neurovascular intact.  No swelling, skin discoloration or atrophy.       Procedures      Imaging Results (Most Recent)     Procedure Component Value Units Date/Time    XR Knee 3 View Bilateral [[id number]] Resulted: 12/16/21 1112     Updated: 12/16/21 1113    Narrative:      X-Ray Report:  Bilateral knee(s) X-Ray  Indication: Evaluation of bilateral knees  AP, Lateral and Standing view(s)  Findings: Well maintained joint space in the medial and lateral   compartment of bilateral knees. Significant arthritis in the   patellofemoral compartment.   Prior studies available for comparison: no            Result Review :     X-Ray Report:  Bilateral knee(s) X-Ray  Indication: Evaluation of bilateral knees  AP, Lateral and Standing view(s)  Findings: Well maintained joint space in the medial and lateral compartment of bilateral knees. Significant arthritis in the patellofemoral compartment.   Prior studies available for comparison: no     Assessment and Plan     DX: Bilateral patellofemoral arthritis     Discussed treatment plans and diagnosis with the patient. Patient prescribed an anti-inflammatory today. Home exercises provided with the patient. A prescription for PT  provided.     Call or return if worsening symptoms.    Follow Up     4-6 weeks.       Patient was given instructions and counseling regarding her condition or for health maintenance advice. Please see specific information pulled into the AVS if appropriate.     Scribed for Vannesa Toledo MD by Katy Jorge.  12/16/21   09:46 EST        I have personally performed the services described in this document as scribed by the above individual and it is both accurate and complete. Vannesa Toledo MD 12/18/21

## 2021-12-29 ENCOUNTER — TELEPHONE (OUTPATIENT)
Dept: ORTHOPEDIC SURGERY | Facility: CLINIC | Age: 61
End: 2021-12-29

## 2021-12-29 NOTE — TELEPHONE ENCOUNTER
Provider: DR. HEATON     Caller: PATIENT    Relationship to Patient: SELF      Phone Number:  [phone]    Reason for Call: PT. LAST SAW DR. HEATON ON 12/16/221 ABOUT BILATERAL KNEE PAIN.   SHE STATES THAT SHE HAD AN ORDER FOR PHYSICAL THERAPY, BUT THEY ARE GOING TO CHARGE HER $200.00 A WEEK, AND SHE CANNOT DO THAT.   PT. STATES THAT SHE HAS HOME EXERCISES TO DO, AND IS GOING TO JAZZERCISE.   SHE HAS A FOLLOW UP APPT. IN February.   JUST WANTED TO LET DR. HEATON KNOW THAT SHE IS NOT GOING TO DO PHYSICAL THERAPY.   CAN CALL PT. WITH ANY QUESTIONS.       used

## 2021-12-30 ENCOUNTER — TELEPHONE (OUTPATIENT)
Dept: GASTROENTEROLOGY | Facility: CLINIC | Age: 61
End: 2021-12-30

## 2021-12-30 NOTE — TELEPHONE ENCOUNTER
----- Message from Shi Parsons ValeriySched Rep sent at 12/30/2021  9:04 AM EST -----  Regarding: pt questions  Contact: [phone]  Pt is calling because she has a colonoscopy scheduled for 01/04 but she has been nauseous, fatigue, and has abdominal pain. She also feels and if her stomach is swollen. Pt wants to know if she should still get the colonoscopy done or what should the next steps be.

## 2021-12-30 NOTE — TELEPHONE ENCOUNTER
"See o/v notes of 11/2.    Call to pt.  States had steak on 12/26 - developed diarrhea and nausea.  Feels \"knot\" in RUQ.  Denies fever.     States diarrhea has resolved.  Asking if should proceed with egd and c/s on 1/4.      Question to pt if symptoms similar to as described 11/2.  Confirms similar, but worried something wrong.  Asking if could be GB related.  Asking if should proceed with scopes.     Update/questions to Dr Mane.   "

## 2022-01-03 ENCOUNTER — LAB (OUTPATIENT)
Dept: LAB | Facility: HOSPITAL | Age: 62
End: 2022-01-03

## 2022-01-03 PROCEDURE — U0004 COV-19 TEST NON-CDC HGH THRU: HCPCS | Performed by: INTERNAL MEDICINE

## 2022-01-03 RX ORDER — POLYETHYLENE GLYCOL 3350 17 G/17G
17 POWDER, FOR SOLUTION ORAL DAILY
COMMUNITY

## 2022-01-04 ENCOUNTER — ANESTHESIA (OUTPATIENT)
Dept: GASTROENTEROLOGY | Facility: HOSPITAL | Age: 62
End: 2022-01-04

## 2022-01-04 ENCOUNTER — HOSPITAL ENCOUNTER (OUTPATIENT)
Facility: HOSPITAL | Age: 62
Setting detail: HOSPITAL OUTPATIENT SURGERY
Discharge: HOME | End: 2022-01-04
Attending: INTERNAL MEDICINE | Admitting: INTERNAL MEDICINE

## 2022-01-04 ENCOUNTER — ANESTHESIA EVENT (OUTPATIENT)
Dept: GASTROENTEROLOGY | Facility: HOSPITAL | Age: 62
End: 2022-01-04

## 2022-01-04 VITALS
DIASTOLIC BLOOD PRESSURE: 78 MMHG | OXYGEN SATURATION: 91 % | RESPIRATION RATE: 18 BRPM | WEIGHT: 188 LBS | TEMPERATURE: 99.1 F | SYSTOLIC BLOOD PRESSURE: 138 MMHG | HEART RATE: 76 BPM | BODY MASS INDEX: 36.91 KG/M2 | HEIGHT: 60 IN

## 2022-01-04 DIAGNOSIS — K59.09 OTHER CONSTIPATION: ICD-10-CM

## 2022-01-04 DIAGNOSIS — R10.10 PAIN OF UPPER ABDOMEN: ICD-10-CM

## 2022-01-04 DIAGNOSIS — R10.13 DYSPEPSIA: ICD-10-CM

## 2022-01-04 PROCEDURE — 45385 COLONOSCOPY W/LESION REMOVAL: CPT | Performed by: INTERNAL MEDICINE

## 2022-01-04 PROCEDURE — 45380 COLONOSCOPY AND BIOPSY: CPT | Performed by: INTERNAL MEDICINE

## 2022-01-04 PROCEDURE — 88305 TISSUE EXAM BY PATHOLOGIST: CPT | Performed by: INTERNAL MEDICINE

## 2022-01-04 PROCEDURE — 43239 EGD BIOPSY SINGLE/MULTIPLE: CPT | Performed by: INTERNAL MEDICINE

## 2022-01-04 PROCEDURE — 25010000002 PROPOFOL 10 MG/ML EMULSION: Performed by: NURSE ANESTHETIST, CERTIFIED REGISTERED

## 2022-01-04 PROCEDURE — S0260 H&P FOR SURGERY: HCPCS | Performed by: INTERNAL MEDICINE

## 2022-01-04 RX ORDER — SODIUM CHLORIDE 0.9 % (FLUSH) 0.9 %
10 SYRINGE (ML) INJECTION AS NEEDED
Status: DISCONTINUED | OUTPATIENT
Start: 2022-01-04 | End: 2022-01-04 | Stop reason: HOSPADM

## 2022-01-04 RX ORDER — GLYCOPYRROLATE 0.2 MG/ML
INJECTION INTRAMUSCULAR; INTRAVENOUS AS NEEDED
Status: DISCONTINUED | OUTPATIENT
Start: 2022-01-04 | End: 2022-01-04 | Stop reason: SURG

## 2022-01-04 RX ORDER — LIDOCAINE HYDROCHLORIDE 20 MG/ML
INJECTION, SOLUTION INFILTRATION; PERINEURAL AS NEEDED
Status: DISCONTINUED | OUTPATIENT
Start: 2022-01-04 | End: 2022-01-04 | Stop reason: SURG

## 2022-01-04 RX ORDER — SODIUM CHLORIDE 0.9 % (FLUSH) 0.9 %
3 SYRINGE (ML) INJECTION EVERY 12 HOURS SCHEDULED
Status: DISCONTINUED | OUTPATIENT
Start: 2022-01-04 | End: 2022-01-04 | Stop reason: HOSPADM

## 2022-01-04 RX ORDER — SODIUM CHLORIDE, SODIUM LACTATE, POTASSIUM CHLORIDE, CALCIUM CHLORIDE 600; 310; 30; 20 MG/100ML; MG/100ML; MG/100ML; MG/100ML
30 INJECTION, SOLUTION INTRAVENOUS CONTINUOUS
Status: DISCONTINUED | OUTPATIENT
Start: 2022-01-04 | End: 2022-01-04 | Stop reason: HOSPADM

## 2022-01-04 RX ORDER — PROPOFOL 10 MG/ML
VIAL (ML) INTRAVENOUS CONTINUOUS PRN
Status: DISCONTINUED | OUTPATIENT
Start: 2022-01-04 | End: 2022-01-04 | Stop reason: SURG

## 2022-01-04 RX ADMIN — PROPOFOL 180 MCG/KG/MIN: 10 INJECTION, EMULSION INTRAVENOUS at 09:05

## 2022-01-04 RX ADMIN — LIDOCAINE HYDROCHLORIDE 60 MG: 20 INJECTION, SOLUTION INFILTRATION; PERINEURAL at 09:05

## 2022-01-04 RX ADMIN — SODIUM CHLORIDE, POTASSIUM CHLORIDE, SODIUM LACTATE AND CALCIUM CHLORIDE 30 ML/HR: 600; 310; 30; 20 INJECTION, SOLUTION INTRAVENOUS at 08:37

## 2022-01-04 RX ADMIN — GLYCOPYRROLATE 0.2 MG: 0.2 INJECTION INTRAMUSCULAR; INTRAVENOUS at 09:02

## 2022-01-04 NOTE — ANESTHESIA POSTPROCEDURE EVALUATION
"Patient: Mine Palm    Procedure Summary     Date: 01/04/22 Room / Location:  [facility]    Anesthesia Start: 0900 Anesthesia Stop: 0940    Procedures:       COLONOSCOPY INTO CECUM AND TI WITH COLD SNARE POLYPECTOMIES, BIOPSIES, COLD BX POLYPECTOMIES (N/A )      ESOPHAGOGASTRODUODENOSCOPY WITH BIOPSIES (N/A Esophagus) Diagnosis:       Dyspepsia      Other constipation      Pain of upper abdomen      (Dyspepsia [R10.13])      (Other constipation [K59.09])      (Pain of upper abdomen [R10.10])    Surgeons: Amrita Mane MD Provider: Caio Chris MD    Anesthesia Type: MAC ASA Status: 3          Anesthesia Type: MAC    Vitals  Vitals Value Taken Time   /78 01/04/22 1002   Temp     Pulse 76 01/04/22 1002   Resp 18 01/04/22 1002   SpO2 91 % 01/04/22 1002           Post Anesthesia Care and Evaluation    Patient location during evaluation: bedside  Patient participation: complete - patient participated  Level of consciousness: awake and alert  Pain management: adequate  Airway patency: patent  Anesthetic complications: No anesthetic complications    Cardiovascular status: acceptable  Respiratory status: acceptable  Hydration status: acceptable    Comments: /78 (BP Location: Left arm, Patient Position: Sitting)   Pulse 76   Temp 37.3 °C (99.1 °F) (Oral)   Resp 18   Ht 152.4 cm (60\")   Wt 85.3 kg (188 lb)   SpO2 91%   BMI 36.72 kg/m²       "

## 2022-01-04 NOTE — ANESTHESIA PREPROCEDURE EVALUATION
Anesthesia Evaluation     Patient summary reviewed and Nursing notes reviewed   history of anesthetic complications:  NPO Solid Status: > 8 hours             Airway   Mallampati: II  TM distance: >3 FB  Neck ROM: full  no difficulty expected  Dental - normal exam     Pulmonary - normal exam   Cardiovascular - normal exam        Neuro/Psych  GI/Hepatic/Renal/Endo    (+) obesity,   liver disease fatty liver disease,     Musculoskeletal     Abdominal  - normal exam   Substance History      OB/GYN          Other                        Anesthesia Plan    ASA 3     MAC       Anesthetic plan, all risks, benefits, and alternatives have been provided, discussed and informed consent has been obtained with: patient.

## 2022-01-04 NOTE — DISCHARGE INSTRUCTIONS
For the next 24 hours patient needs to be with a responsible adult.    For 24 hours DO NOT drive, operate machinery, appliances, drink alcohol, make important decisions or sign legal documents.    Start with a light or bland diet if you are feeling sick to your stomach otherwise advance to regular diet as tolerated.    Follow recommendations on procedure report if provided by your doctor.    Call Dr Mane for problems [phone]    Problems may include but not limited to: large amounts of bleeding, trouble breathing, repeated vomiting, severe unrelieved pain, fever or chills.

## 2022-01-04 NOTE — H&P
"[facility]  Pre Procedure History & Physical    Chief Complaint: Dyspepsia, chronic constipation, abdominal pain      HPI: Mine Palm is a 60 y.o. female with a past medical history noted below who presents for follow-up of multiple chronic GI issues including constipation, dyspepsia, hepatic steatosis.     PPI has helped her belching.  However, she still doesn't feel quite right.       Feels like something is \"stuck\" in the right side of her abdomen     Raises up and burps a lot at night and that is interrupting her sleep.  She feels that is making her not feel well due to interrupting her sleep.     Stools are hard to push out.  Stools are initially hard and then softer.  Taking Metamucil, sometimes 2 capsules sometimes 4 in a day.  Not regular with this.  Taking MiraLAX as needed.  She feels that it sort of helps some.  She did recently see some blood in her stool, she thinks this was from straining.     Takes pantoprazole every morning.         Past Medical History:   Past Medical History:   Diagnosis Date   • Constipation    • Hepatic steatosis    • Irritable bowel syndrome 2019   • PONV (postoperative nausea and vomiting)        Family History:  Family History   Problem Relation Age of Onset   • Malig Hyperthermia Neg Hx        Social History:   reports that she has never smoked. She has never used smokeless tobacco. She reports current alcohol use. She reports that she does not use drugs.    Medications:   Medications Prior to Admission   Medication Sig Dispense Refill Last Dose   • Ascorbic Acid (VITAMIN C PO) Take  by mouth Daily.      • estradiol (VIVELLE-DOT) 0.0375 MG/24HR patch 1 patch 2 (Two) Times a Week.      • famotidine (Pepcid) 20 MG tablet Take 1 tablet by mouth Every Night. 30 tablet 0    • meloxicam (Mobic) 15 MG tablet Take 1 tablet by mouth Daily. 30 tablet 1    • polyethylene glycol (MiraLax) 17 g packet Take 17 g by mouth Daily.      • Progesterone " (PROMETRIUM) 100 MG capsule Take 100 mg by mouth every night at bedtime.      • psyllium (METAMUCIL) 58.6 % packet Take 1 packet by mouth Daily.      • vitamin D3 125 MCG (5000 UT) capsule capsule Take 5,000 Units by mouth Daily.      • pantoprazole (PROTONIX) 20 MG EC tablet Take 1 tablet by mouth Every Morning Before Breakfast. 30 tablet 1        Allergies:  Patient has no known allergies.    ROS:    Pertinent items are noted in HPI     Objective     There were no vitals taken for this visit.    Physical Exam   Constitutional: Pt is oriented to person, place, and time and well-developed, well-nourished, and in no distress.   HENT:   Mouth/Throat: Oropharynx is clear and moist.   Neck: Normal range of motion. Neck supple.   Cardiovascular: Normal rate, regular rhythm and normal heart sounds.    Pulmonary/Chest: Effort normal and breath sounds normal. No respiratory distress. No  wheezes.   Abdominal: Soft. Bowel sounds are normal.   Skin: Skin is warm and dry.   Psychiatric: Mood, memory, affect and judgment normal.     Assessment/Plan     Diagnosis: Dyspepsia, chronic constipation, abdominal pain      Anticipated Surgical Procedure:  EGD  Colonoscopy    The risks, benefits, and alternatives of this procedure have been discussed with the patient or the responsible party- the patient understands and agrees to proceed.

## 2022-01-05 LAB
LAB AP CASE REPORT: NORMAL
PATH REPORT.FINAL DX SPEC: NORMAL
PATH REPORT.GROSS SPEC: NORMAL

## 2022-01-06 NOTE — PROGRESS NOTES
Biopsies from her EGD show mild inflammation of the small bowel.  Normal stomach bodyHer colon polyps included 3 tubular adenomas, one benign hyperplastic polyp.    Random colon biopsies showed normal healthy tissueSuspected irritable bowel causing alternating diarrhea and constipation  Please place in 5-year colonoscopy recall, thanks    Office follow-up in 3 to 4 months, thanks

## 2022-01-07 ENCOUNTER — TELEPHONE (OUTPATIENT)
Dept: GASTROENTEROLOGY | Facility: CLINIC | Age: 62
End: 2022-01-07

## 2022-01-07 DIAGNOSIS — R10.13 DYSPEPSIA: ICD-10-CM

## 2022-01-07 DIAGNOSIS — K59.09 OTHER CONSTIPATION: Primary | ICD-10-CM

## 2022-01-07 NOTE — TELEPHONE ENCOUNTER
Call to pt.  Advise per Dr Mane note.  Verb understanding     C/s for 1/4/27 placed in recall and HM.     F/u appt scheduled with DR Mane for 4/11 @ 10:30 am.     Pt asking if:  1) should see nutritionist for special diet to manage symptoms  2) HS pepcid causes constipation so does not take.  Asking if appropriate alternative for HS.    3) taking daily metamucil and miralax for bowel pattern - any other recommendations.     Questions to Dr Mane.

## 2022-01-07 NOTE — TELEPHONE ENCOUNTER
----- Message from Amrita Mane MD sent at 1/6/2022  2:07 PM EST -----  Biopsies from her EGD show mild inflammation of the small bowel.  Normal stomach bodyHer colon polyps included 3 tubular adenomas, one benign hyperplastic polyp.    Random colon biopsies showed normal healthy tissueSuspected irritable bowel causing alternating diarrhea and constipation  Please place in 5-year colonoscopy recall, thanks    Office follow-up in 3 to 4 months, thanks

## 2022-01-08 NOTE — TELEPHONE ENCOUNTER
Okay to stop the Pepcid if she feels like it is not helping.    The Metamucil and MiraLAX are a great bowel regimen.    Referral to dietitian made. I always think this is reasonable.

## 2022-01-18 ENCOUNTER — APPOINTMENT (OUTPATIENT)
Dept: WOMENS IMAGING | Facility: HOSPITAL | Age: 62
End: 2022-01-18

## 2022-01-18 PROCEDURE — 77063 BREAST TOMOSYNTHESIS BI: CPT | Performed by: RADIOLOGY

## 2022-01-18 PROCEDURE — 77067 SCR MAMMO BI INCL CAD: CPT | Performed by: RADIOLOGY

## 2022-02-01 RX ORDER — PANTOPRAZOLE SODIUM 20 MG/1
20 TABLET, DELAYED RELEASE ORAL
Qty: 30 TABLET | Refills: 2 | Status: SHIPPED | OUTPATIENT
Start: 2022-02-01 | End: 2022-05-17 | Stop reason: SDUPTHER

## 2022-05-17 ENCOUNTER — OFFICE VISIT (OUTPATIENT)
Dept: GASTROENTEROLOGY | Facility: CLINIC | Age: 62
End: 2022-05-17

## 2022-05-17 VITALS
HEART RATE: 80 BPM | SYSTOLIC BLOOD PRESSURE: 131 MMHG | WEIGHT: 195 LBS | HEIGHT: 60 IN | TEMPERATURE: 97.5 F | BODY MASS INDEX: 38.28 KG/M2 | DIASTOLIC BLOOD PRESSURE: 79 MMHG

## 2022-05-17 DIAGNOSIS — R10.13 DYSPEPSIA: ICD-10-CM

## 2022-05-17 DIAGNOSIS — K58.2 IRRITABLE BOWEL SYNDROME WITH BOTH CONSTIPATION AND DIARRHEA: Primary | ICD-10-CM

## 2022-05-17 DIAGNOSIS — K76.0 HEPATIC STEATOSIS: ICD-10-CM

## 2022-05-17 PROCEDURE — 99213 OFFICE O/P EST LOW 20 MIN: CPT | Performed by: PHYSICIAN ASSISTANT

## 2022-05-17 RX ORDER — PANTOPRAZOLE SODIUM 20 MG/1
20 TABLET, DELAYED RELEASE ORAL
Qty: 90 TABLET | Refills: 3 | Status: SHIPPED | OUTPATIENT
Start: 2022-05-17

## 2022-05-17 NOTE — PROGRESS NOTES
"Chief Complaint  Hepatic Steatosis and Irritable Bowel Syndrome    Subjective          History of Present Illness    Mine Palm is a  61 y.o. female presents for follow-up on multiple chronic GI issues with history of IBS and hepatic steatosis.  She is a patient of Dr. Mane last seen for endoscopies on 1/4/2022.  She is new to me.    She takes Benefiber and MiraLAX every day. She reports this has regulated her BMs. She still has some bloating but overall doing much better.    Currently taking Pantoprazole 20mg QD. She cut out coffee and is avoiding red meat. She feels diet change and PPI really has helped her symptoms.  Overall she is doing well with this regimen.    She denies nausea, vomiting, weight loss, melena, hematochezia, nocturnal symptoms.    Colonoscopy on 1/4/2022 showed 4 colon polyps, diverticulosis, nonbleeding internal hemorrhoids.  Pathology showed tubular adenomas and hyperplastic changes.  Random colon biopsies unremarkable.  Recall 5 years.  EGD at the same time showed mild gastritis, sliding hiatal hernia, irregular Z-line.  Pathology showed focal mild nonspecific peptic duodenitis, negative stomach biopsies.    Objective   Vital Signs:   /79   Pulse 80   Temp 97.5 °F (36.4 °C)   Ht 152.4 cm (60\")   Wt 88.5 kg (195 lb)   BMI 38.08 kg/m²       Physical Exam  Vitals reviewed.   Constitutional:       General: She is awake. She is not in acute distress.     Appearance: Normal appearance. She is well-developed and well-groomed.   HENT:      Head: Normocephalic.   Pulmonary:      Effort: Pulmonary effort is normal. No respiratory distress.   Abdominal:      General: Abdomen is protuberant. Bowel sounds are normal. There is no distension.      Palpations: Abdomen is soft. There is no hepatomegaly or mass.      Tenderness: There is abdominal tenderness in the epigastric area. There is no guarding or rebound. Negative signs include Shah's sign.   Skin:     Coloration: Skin is " not pale.   Neurological:      Mental Status: She is alert and oriented to person, place, and time.      Gait: Gait is intact.   Psychiatric:         Mood and Affect: Mood and affect normal.         Speech: Speech normal.         Behavior: Behavior is cooperative.         Judgment: Judgment normal.          Result Review :             Assessment and Plan    Diagnoses and all orders for this visit:    1. Irritable bowel syndrome with both constipation and diarrhea (Primary)    2. Dyspepsia    3. Hepatic steatosis    Other orders  -     pantoprazole (PROTONIX) 20 MG EC tablet; Take 1 tablet by mouth Every Morning Before Breakfast.  Dispense: 90 tablet; Refill: 3    Overall she is much better than previous on her current regimen.  She is happy with her progress and current symptoms.  She elects to hold off on further treatment at this time which I think is very reasonable.    We did discuss possible SIBO component which may be source of persistent bloating.  We also had some discussion about making sure that she is completely cleared out and now with remaining stool burden as this will also worsen the bloating.  Would consider xifaxan for possible SIBO if symptoms flare.     Follow Up   Return in about 1 year (around 5/17/2023).    Dragon dictation used throughout this note.     Judy Shah PA-C

## 2023-01-09 ENCOUNTER — OFFICE VISIT (OUTPATIENT)
Dept: FAMILY MEDICINE CLINIC | Age: 63
End: 2023-01-09
Payer: COMMERCIAL

## 2023-01-09 VITALS
OXYGEN SATURATION: 97 % | HEIGHT: 60 IN | DIASTOLIC BLOOD PRESSURE: 68 MMHG | TEMPERATURE: 98.4 F | HEART RATE: 79 BPM | SYSTOLIC BLOOD PRESSURE: 160 MMHG | WEIGHT: 204 LBS | BODY MASS INDEX: 40.05 KG/M2

## 2023-01-09 DIAGNOSIS — R51.9 ACUTE NONINTRACTABLE HEADACHE, UNSPECIFIED HEADACHE TYPE: Primary | ICD-10-CM

## 2023-01-09 PROBLEM — H35.9 RETINAL DISORDER: Status: ACTIVE | Noted: 2023-01-09

## 2023-01-09 PROCEDURE — 99214 OFFICE O/P EST MOD 30 MIN: CPT | Performed by: NURSE PRACTITIONER

## 2023-01-09 RX ORDER — ACYCLOVIR 400 MG/1
400 TABLET ORAL AS NEEDED
COMMUNITY
Start: 2022-09-29

## 2023-01-09 RX ORDER — ESTRADIOL 0.05 MG/D
FILM, EXTENDED RELEASE TRANSDERMAL
COMMUNITY
Start: 2022-11-12

## 2023-01-09 NOTE — PROGRESS NOTES
Chief Complaint  Mine Paml presents to [facility] for Headache (Patient states that she gets headaches almost daily along with pressure in the back of her head when laying down X2 months /Concerned about family Hx of brain tumor )      Subjective     History of Present Illness  Head pain / pressure x 2 months  It is in the posterior head - laying on left side - feels dizzy and if lying on back of head- feels pressure.  She is concerned since her father had a brain tumor at the age of 12.   Headache: Patient presents with headache. Symptoms began about 2 months ago. Generally, the headaches last about all day and occur daily. The headache do not seem to be related to any time of the day. The headaches are usually dull and are occipital in location.  The patient rates her most severe headaches a 8 on a scale from 1 to 10. Recently, the headaches are waxing and waning in intensity.  Precipitating factors include none which have been determined. The headaches are usually not preceded by an aura. Associated neurologic symptoms which are present include: dizziness. The patient denies numbness of extremities, speech difficulties, vision problems and vomiting in the early morning. Other associated symptoms include: nothing pertinent.  Symptoms which are not present include: nothing pertinent. Home treatment has included tylenol may decrease intensity, but does not relieve . Other history includes: migraine headaches diagnosed in the past. Family history includes father with brain tumor.   Last vision screening 2 months ago [facility]         Assessment and Plan       Diagnoses and all orders for this visit:    1. Acute nonintractable headache, unspecified headache type (Primary)  Comments:  CT head for further evaluation- to ER should worsen / symptoms change  Orders:  -     CT Head Without Contrast; Future        No orders of the defined types were placed in  "this encounter.      Medications Discontinued During This Encounter   Medication Reason   • estradiol (VIVELLE-DOT) 0.0375 MG/24HR patch Alternate therapy       Follow Up   Return for Pending imaging results.         Review of Systems    Objective     Vitals:    01/09/23 1547   BP: 160/68   BP Location: Left arm   Patient Position: Sitting   Cuff Size: Adult   Pulse: 79   Temp: 98.4 °F (36.9 °C)   TempSrc: Oral   SpO2: 97%   Weight: 92.5 kg (204 lb)   Height: 152.4 cm (60\")     Body mass index is 39.84 kg/m².     Physical Exam  Constitutional:       General: She is not in acute distress.     Appearance: Normal appearance.   HENT:      Head: Normocephalic.   Cardiovascular:      Rate and Rhythm: Normal rate and regular rhythm.   Pulmonary:      Effort: Pulmonary effort is normal.      Breath sounds: Normal breath sounds.   Musculoskeletal:         General: Normal range of motion.   Neurological:      General: No focal deficit present.      Mental Status: She is alert and oriented to person, place, and time.      Cranial Nerves: Cranial nerves 2-12 are intact.      Coordination: Coordination is intact.   Psychiatric:         Mood and Affect: Mood normal.         Behavior: Behavior normal.            Result Review                       Allergies   Allergen Reactions   • Tape Rash      Past Medical History:   Diagnosis Date   • Colon polyp removed 1/4/22   • Constipation    • Hepatic steatosis    • Irritable bowel syndrome 2019   • PONV (postoperative nausea and vomiting)      Current Outpatient Medications   Medication Sig Dispense Refill   • acyclovir (ZOVIRAX) 400 MG tablet Take 400 mg by mouth As Needed.     • Ascorbic Acid (VITAMIN C PO) Take  by mouth Daily.     • estradiol (MINIVELLE, VIVELLE-DOT) 0.05 MG/24HR patch APPLY 1 PATCH TOPICALLY TO THE SKIN 2 TIMES A WEEK     • pantoprazole (PROTONIX) 20 MG EC tablet Take 1 tablet by mouth Every Morning Before Breakfast. 90 tablet 3   • polyethylene glycol (MIRALAX) 17 " g packet Take 17 g by mouth Daily.     • Progesterone (PROMETRIUM) 100 MG capsule Take 100 mg by mouth every night at bedtime.     • psyllium (METAMUCIL) 58.6 % packet Take 1 packet by mouth Daily.     • vitamin D3 125 MCG (5000 UT) capsule capsule Take 5,000 Units by mouth Daily.       No current facility-administered medications for this visit.     Past Surgical History:   Procedure Laterality Date   • BILATERAL BREAST REDUCTION     •  SECTION     • COLONOSCOPY     • COLONOSCOPY N/A 2022    Procedure: COLONOSCOPY INTO CECUM AND TI WITH COLD SNARE POLYPECTOMIES, BIOPSIES, COLD BX POLYPECTOMIES;  Surgeon: Amrita Mane MD;  Location: [facility] ENDOSCOPY;  Service: Gastroenterology;  Laterality: N/A;  PRE: CHANGE IN BOWEL HABITS  POST: HEMORRHOIDS, DIVERTICULOSIS, POLYPS   • ENDOSCOPY N/A 2022    Procedure: ESOPHAGOGASTRODUODENOSCOPY WITH BIOPSIES;  Surgeon: Amrita Mane MD;  Location: [facility] ENDOSCOPY;  Service: Gastroenterology;  Laterality: N/A;  PRE: DYSPEPSIA, ABDOMINAL PAIN  POST:  SMALL HIATAL HERNIA, GASTRITIS   • HYSTERECTOMY        Health Maintenance Due   Topic Date Due   • MAMMOGRAM  Never done   • Pneumococcal Vaccine 0-64 (1 - PCV) Never done   • TDAP/TD VACCINES (1 - Tdap) Never done   • ZOSTER VACCINE (1 of 2) Never done   • ANNUAL PHYSICAL  Never done      Immunization History   Administered Date(s) Administered   • COVID-19 (MODERNA) BIVALENT BOOSTER 12+YRS 2022   • COVID-19 (PFIZER) PURPLE CAP 2021, 2021, 10/18/2021   • Covid-19 (Pfizer) Gray Cap 2022   • FluLaval/Fluzone >6mos 10/14/2020, 10/18/2021, 10/24/2022

## 2023-01-19 ENCOUNTER — TELEPHONE (OUTPATIENT)
Dept: FAMILY MEDICINE CLINIC | Age: 63
End: 2023-01-19
Payer: COMMERCIAL

## 2023-01-19 NOTE — TELEPHONE ENCOUNTER
Pt is going to check with insurance regarding CT head to see if they are going to cover it before scheduling.

## 2023-02-02 ENCOUNTER — TELEPHONE (OUTPATIENT)
Dept: FAMILY MEDICINE CLINIC | Age: 63
End: 2023-02-02
Payer: COMMERCIAL

## 2023-02-02 NOTE — TELEPHONE ENCOUNTER
Pt was scheduled for a CT on 1/18 and canceled the appoint. Myself and referrals have tried to contact her about rescheduling 4 times and she still hasn't answered or returned a call. How would you like to proceed?

## 2023-04-18 RX ORDER — ACYCLOVIR 400 MG/1
TABLET ORAL
Qty: 20 TABLET | Refills: 0 | Status: SHIPPED | OUTPATIENT
Start: 2023-04-18

## 2023-05-08 ENCOUNTER — TELEPHONE (OUTPATIENT)
Dept: GASTROENTEROLOGY | Facility: CLINIC | Age: 63
End: 2023-05-08

## 2023-05-08 NOTE — TELEPHONE ENCOUNTER
Provider: DR. YANI ROLLE  Caller: JACI REYES  Relationship to Patient: SELF  Pharmacy:   Phone Number: [phone]  Reason for Call:   PATIENT THOUGHT SHE HAD AN APPOINTMENT SCHEDULED FOR 5/17/23.  I ONLY SEE THAT SHE HAD AN APPOINTMENT LAST YEAR.  HUB SCHEDULED A FOLLOW UP APPOINTMENT FOR 8/8/23.  PLEASE CALL AND ADVISE IF THIS IS CORRECT.

## 2023-05-30 RX ORDER — PANTOPRAZOLE SODIUM 20 MG/1
20 TABLET, DELAYED RELEASE ORAL
Qty: 90 TABLET | Refills: 0 | Status: SHIPPED | OUTPATIENT
Start: 2023-05-30

## 2023-06-20 ENCOUNTER — OFFICE (OUTPATIENT)
Dept: URBAN - METROPOLITAN AREA CLINIC 76 | Facility: CLINIC | Age: 63
End: 2023-06-20

## 2023-06-20 VITALS
OXYGEN SATURATION: 96 % | DIASTOLIC BLOOD PRESSURE: 71 MMHG | WEIGHT: 199 LBS | SYSTOLIC BLOOD PRESSURE: 154 MMHG | HEART RATE: 76 BPM | HEIGHT: 60 IN

## 2023-06-20 DIAGNOSIS — R10.11 RIGHT UPPER QUADRANT PAIN: ICD-10-CM

## 2023-06-20 DIAGNOSIS — R14.2 ERUCTATION: ICD-10-CM

## 2023-06-20 DIAGNOSIS — R53.83 OTHER FATIGUE: ICD-10-CM

## 2023-06-20 DIAGNOSIS — R19.7 DIARRHEA, UNSPECIFIED: ICD-10-CM

## 2023-06-20 DIAGNOSIS — R11.0 NAUSEA: ICD-10-CM

## 2023-06-20 PROCEDURE — 99204 OFFICE O/P NEW MOD 45 MIN: CPT | Performed by: INTERNAL MEDICINE

## 2023-06-20 RX ORDER — FAMOTIDINE 40 MG/1
40 TABLET, FILM COATED ORAL
Qty: 30 | Refills: 3 | Status: ACTIVE
Start: 2023-06-20

## 2023-09-26 RX ORDER — PANTOPRAZOLE SODIUM 20 MG/1
20 TABLET, DELAYED RELEASE ORAL
Qty: 90 TABLET | Refills: 0 | Status: SHIPPED | OUTPATIENT
Start: 2023-09-26

## 2023-12-05 ENCOUNTER — OFFICE VISIT (OUTPATIENT)
Dept: GASTROENTEROLOGY | Facility: CLINIC | Age: 63
End: 2023-12-05
Payer: COMMERCIAL

## 2023-12-05 VITALS
WEIGHT: 183.4 LBS | HEART RATE: 62 BPM | HEIGHT: 60 IN | OXYGEN SATURATION: 97 % | BODY MASS INDEX: 36.01 KG/M2 | TEMPERATURE: 98 F

## 2023-12-05 DIAGNOSIS — K58.2 IRRITABLE BOWEL SYNDROME WITH BOTH CONSTIPATION AND DIARRHEA: Primary | ICD-10-CM

## 2023-12-05 DIAGNOSIS — R10.13 DYSPEPSIA: ICD-10-CM

## 2023-12-05 DIAGNOSIS — K76.0 HEPATIC STEATOSIS: ICD-10-CM

## 2023-12-05 RX ORDER — PANTOPRAZOLE SODIUM 20 MG/1
20 TABLET, DELAYED RELEASE ORAL
Qty: 90 TABLET | Refills: 3 | Status: CANCELLED | OUTPATIENT
Start: 2023-12-05

## 2023-12-05 RX ORDER — FAMOTIDINE 40 MG/1
40 TABLET, FILM COATED ORAL
COMMUNITY
Start: 2023-08-29 | End: 2023-12-05

## 2023-12-05 NOTE — PROGRESS NOTES
"Chief Complaint  Med Refill    Subjective          History of Present Illness    Mine Palm is a  62 y.o. female presents for follow-up on GERD and constipation.  She is a former patient Dr. Mane last seen by me on 5/17/2022.    She started diet with [employer]--lost 23lbs, feeling much better, normal digestion. She is eating high fiber meal with this diet. No longer needs fiber supplement or miralax.  No longer needs PPI.  Her reflux is well-controlled.  She is eating 1 meal with meat and vegetables a day and a nutrition shake the second time a day.  When she reaches her goal weight, she will go on a maintenance plan.  They also have nutrition coaches which  her her diet.    7/12/2023 gastric emptying scan showed mild delay at 3 hours with 36% but this resolved with 6% food remaining at 4 hours.  T1/2 is 134 minutes.    7/12/2023 ultrasound showed diffusely heterogeneous and coarsened hepatic echotexture likely related to hepatic steatosis.  LFTs the same day were normal.    Colonoscopy on 1/4/2022 showed 4 colon polyps, diverticulosis, nonbleeding internal hemorrhoids.  Pathology showed tubular adenomas and hyperplastic changes.  Random colon biopsies unremarkable.  Recall 5 years.    EGD at the same time showed mild gastritis, sliding hiatal hernia, irregular Z-line.  Pathology showed focal mild nonspecific peptic duodenitis, negative stomach biopsies.    Objective   Vital Signs:   Pulse 62   Temp 98 °F (36.7 °C)   Ht 152.4 cm (60\")   Wt 83.2 kg (183 lb 6.4 oz)   SpO2 97%   BMI 35.82 kg/m²       Physical Exam  Vitals reviewed.   Constitutional:       General: She is awake. She is not in acute distress.     Appearance: Normal appearance. She is well-developed and well-groomed.   HENT:      Head: Normocephalic.   Pulmonary:      Effort: Pulmonary effort is normal. No respiratory distress.   Skin:     Coloration: Skin is not pale.   Neurological:      Mental Status: She is alert and " oriented to person, place, and time.      Gait: Gait is intact.   Psychiatric:         Mood and Affect: Mood and affect normal.         Speech: Speech normal.         Behavior: Behavior is cooperative.         Judgment: Judgment normal.          Result Review :             Assessment and Plan    Diagnoses and all orders for this visit:    1. Irritable bowel syndrome with both constipation and diarrhea (Primary)    2. Dyspepsia    3. Hepatic steatosis    She is doing much better 23 pound weight loss and eating healthier.  Encouraged her to continue this.  She will eventually progress to a maintenance plan with the current diet she is on.  We did discuss the importance of having a maintenance plan with lifestyle changes to keep the weight off.    I do not recommend restarting medication for GERD given she is asymptomatic.    She does have fatty liver which will likely improve with her continued weight loss.    She did see Dr. Sharp, GI, over the summer when she could not get in with our office.  We discussed office policy that she should not see other GI within a year of seeing us.  She agrees to continue to follow with us.  Advised that if she cannot be seen in a reasonable time, to let me know and we will try to work her in.    Follow Up   Return in about 1 year (around 12/5/2024).    Dragon dictation used throughout this note.     Judy Shah PA-C

## 2025-01-09 ENCOUNTER — TELEPHONE (OUTPATIENT)
Dept: FAMILY MEDICINE CLINIC | Age: 65
End: 2025-01-09
Payer: COMMERCIAL

## 2025-01-09 NOTE — TELEPHONE ENCOUNTER
Hub staff attempted to follow warm transfer process and was unsuccessful     Caller: Mine Palm    Relationship to patient: Self    Best call back number: [phone]    Patient is needing: PATIENT WOULD LIKE TO BE A NEW PATIENT TO [facility]. PLEASE CALL PATIENT BACK AND ADVISE AND SCHEDULE IF POSSIBLE. I WAS UNABLE TO FIND ANY APPOINTMENTS FOR OVER SIX MONTHS IN EPIC. THEREFORE, IT'S NOT CLEAR IF SHE IS TAKING NEW PATIENTS OR NOT. APPEARS TO BE SHE IS NOT. PATIENT WOULD LIKE A FEMALE PROVIDER THAT LISTENS AND SHE FEELS WOULD BE RECOMMENDED. PLEASE CALL PATIENT BACK AND ADVISE.

## 2025-02-03 ENCOUNTER — OFFICE VISIT (OUTPATIENT)
Dept: FAMILY MEDICINE CLINIC | Age: 65
End: 2025-02-03
Payer: COMMERCIAL

## 2025-02-03 ENCOUNTER — HOSPITAL ENCOUNTER (OUTPATIENT)
Dept: GENERAL RADIOLOGY | Facility: HOSPITAL | Age: 65
Discharge: HOME | End: 2025-02-03
Admitting: INTERNAL MEDICINE
Payer: COMMERCIAL

## 2025-02-03 VITALS
BODY MASS INDEX: 34.24 KG/M2 | TEMPERATURE: 98.7 F | WEIGHT: 174.4 LBS | SYSTOLIC BLOOD PRESSURE: 144 MMHG | HEART RATE: 73 BPM | HEIGHT: 60 IN | DIASTOLIC BLOOD PRESSURE: 71 MMHG | OXYGEN SATURATION: 99 %

## 2025-02-03 DIAGNOSIS — K21.9 GASTROESOPHAGEAL REFLUX DISEASE WITHOUT ESOPHAGITIS: ICD-10-CM

## 2025-02-03 DIAGNOSIS — G43.709 CHRONIC MIGRAINE WITHOUT AURA WITHOUT STATUS MIGRAINOSUS, NOT INTRACTABLE: Primary | ICD-10-CM

## 2025-02-03 DIAGNOSIS — Z76.89 ENCOUNTER TO ESTABLISH CARE WITH NEW DOCTOR: ICD-10-CM

## 2025-02-03 DIAGNOSIS — K76.0 HEPATIC STEATOSIS: ICD-10-CM

## 2025-02-03 PROBLEM — K58.9 IBS (IRRITABLE BOWEL SYNDROME): Status: ACTIVE | Noted: 2025-02-03

## 2025-02-03 PROBLEM — M54.2 CHRONIC NECK PAIN: Status: ACTIVE | Noted: 2025-02-03

## 2025-02-03 PROBLEM — G89.29 CHRONIC NECK PAIN: Status: ACTIVE | Noted: 2025-02-03

## 2025-02-03 PROCEDURE — 72040 X-RAY EXAM NECK SPINE 2-3 VW: CPT

## 2025-02-03 PROCEDURE — 99214 OFFICE O/P EST MOD 30 MIN: CPT | Performed by: INTERNAL MEDICINE

## 2025-02-03 RX ORDER — PROGESTERONE 200 MG/1
200 CAPSULE ORAL
COMMUNITY
Start: 2025-01-14

## 2025-02-03 NOTE — PROGRESS NOTES
"Chief Complaint  Establish Care and Headache    Subjective      Mine Palm is a 64 y.o. female who presents to [facility]     Patient Care Team:  Robert Lopez MD as PCP - General (Internal Medicine)  Mayela Baker MD as Consulting Physician (Obstetrics and Gynecology)  Provider, No Known  Mayela Baker MD as Consulting Physician (Obstetrics and Gynecology)  Patient presents to [facility].  She was previously being followed by [facility].  Past medical history is significant for headache, class I obesity, fatty liver disease and gastroesophageal reflux disease.  Also with history of irritable bowel syndrome previously followed by gastroenterology.She presents today with chief complaint of headache.  The pain starts in the back of the head near the neck and then radiates to the front.  Associated with nausea denies any photophobia.  Takes over-the-counter medication for pain.  She does complain of significant neck pain and discomfort.    Review of Systems  Full review of systems obtained and pertinent positives states in above HPI.  Otherwise all others reviewed and are negative.    Objective   Vital Signs:   Vitals:    02/03/25 1248   BP: 144/71   BP Location: Right arm   Patient Position: Sitting   Pulse: 73   Temp: 98.7 °F (37.1 °C)   TempSrc: Temporal   SpO2: 99%   Weight: 79.1 kg (174 lb 6.4 oz)   Height: 152.4 cm (60\")     Body mass index is 34.06 kg/m².    Wt Readings from Last 3 Encounters:   02/03/25 79.1 kg (174 lb 6.4 oz)   12/05/23 83.2 kg (183 lb 6.4 oz)   01/09/23 92.5 kg (204 lb)     BP Readings from Last 3 Encounters:   02/03/25 144/71   01/09/23 160/68   05/17/22 131/79       Health Maintenance   Topic Date Due    Pneumococcal Vaccine 0-64 (1 of 2 - PCV) Never done    TDAP/TD VACCINES (1 - Tdap) Never done    MAMMOGRAM  Never done    ZOSTER VACCINE (1 of 2) Never done    ANNUAL PHYSICAL  Never done    INFLUENZA VACCINE  07/01/2024    " COVID-19 Vaccine (6 - 2024-25 season) 09/01/2024    COLORECTAL CANCER SCREENING  01/04/2027    HEPATITIS C SCREENING  Completed       Physical Exam   GEN:NAD, well nourished  HEENT:NCAT, PERRL, EOMI, OP clear, MMM  NECK:supple, no JVD, no carotid bruits  CVA:RRR s1, s2 no m/r/g  CHEST:CTA B no wheezes or rhonchi  ABD:soft, nontender, nondistended +bs  EXT:no c/c/e 2+PP B  NEURO:CN II-XII grossly nonfocal, no evidence of asterixes or tremor  PSYCH: appropriate mood and affect  :deferred  SKIN: warm, dry, intact, no lesions or rashes    Result Review   The following data was reviewed by: Robert Lopez MD on 02/03/2025:  [x]  Tests & Results  []  Hospitalization/Emergency Department/Urgent Care  []  Internal/External Consultant Notes    Procedures          ASSESSMENT/PLAN  Diagnoses and all orders for this visit:    1. Chronic migraine without aura without status migrainosus, not intractable (Primary)  Comments:  Check x-ray of the cervical spine as well as CT of the head without contrast  Orders:  -     CBC & Differential  -     Hemoglobin A1c  -     Lipid Panel; Future  -     Comprehensive Metabolic Panel    2. Encounter to establish care with new doctor  Comments:  Check CBC, CMP, hemoglobin A1c, lipid profile    3. Hepatic steatosis  Comments:  Check CMP and evaluate liver enzymes  Orders:  -     CT Head Without Contrast; Future  -     XR Spine Cervical 2 or 3 View    4. Gastroesophageal reflux disease without esophagitis  Comments:  Continue to follow with GI as needed        BMI is >= 30 and <35. (Class 1 Obesity). The following options were offered after discussion;: exercise counseling/recommendations and nutrition counseling/recommendations       Mine Palm  reports that she has never smoked. She has never used smokeless tobacco.            FOLLOW UP  5/5/2025  Patient was given instructions and counseling regarding her condition or for health maintenance advice. Please see specific  information pulled into the AVS if appropriate.       Robert Lopez MD  02/03/25  13:03 EST

## 2025-02-07 ENCOUNTER — PATIENT ROUNDING (BHMG ONLY) (OUTPATIENT)
Dept: FAMILY MEDICINE CLINIC | Age: 65
End: 2025-02-07
Payer: COMMERCIAL

## 2025-02-07 NOTE — PROGRESS NOTES
February 7, 2025    Hello, may I speak with Mine Palm?    My name is AJ      I am  with [facility]  [address] [facility] [address]  [place] [address] [phone].    Before we get started may I verify your date of birth? 1960    I am calling to officially welcome you to our practice and ask about your recent visit. Is this a good time to talk? YES    Tell me about your visit with us. What things went well? Everything went fine. Dr. Lopez was great. She answered my questions, I really liked her.       We're always looking for ways to make our patients' experiences even better. Do you have recommendations on ways we may improve? NO    Overall were you satisfied with your first visit to our practice? YES       I appreciate you taking the time to speak with me today. Is there anything else I can do for you? NO      Thank you, and have a great day.

## 2025-02-10 ENCOUNTER — TELEPHONE (OUTPATIENT)
Dept: FAMILY MEDICINE CLINIC | Age: 65
End: 2025-02-10
Payer: COMMERCIAL

## 2025-02-14 NOTE — PROGRESS NOTES
The x-ray of your cervical spine did reveal degenerative endplate osteophyte formation at the level of C6.  There was no evidence of fracture.  There is no definitive way to say whether or not this has been leading to your migraines.

## 2025-02-18 ENCOUNTER — TELEPHONE (OUTPATIENT)
Dept: FAMILY MEDICINE CLINIC | Age: 65
End: 2025-02-18
Payer: COMMERCIAL

## 2025-02-20 DIAGNOSIS — K76.0 HEPATIC STEATOSIS: ICD-10-CM

## 2025-02-21 ENCOUNTER — LAB (OUTPATIENT)
Dept: LAB | Facility: HOSPITAL | Age: 65
End: 2025-02-21
Payer: COMMERCIAL

## 2025-02-21 DIAGNOSIS — G43.709 CHRONIC MIGRAINE WITHOUT AURA WITHOUT STATUS MIGRAINOSUS, NOT INTRACTABLE: ICD-10-CM

## 2025-02-21 LAB
ALBUMIN SERPL-MCNC: 4.6 G/DL (ref 3.5–5.2)
ALBUMIN/GLOB SERPL: 1.6 G/DL
ALP SERPL-CCNC: 28 U/L (ref 39–117)
ALT SERPL W P-5'-P-CCNC: 16 U/L (ref 1–33)
ANION GAP SERPL CALCULATED.3IONS-SCNC: 14 MMOL/L (ref 5–15)
AST SERPL-CCNC: 18 U/L (ref 1–32)
BASOPHILS # BLD AUTO: 0.01 10*3/MM3 (ref 0–0.2)
BASOPHILS NFR BLD AUTO: 0.1 % (ref 0–1.5)
BILIRUB SERPL-MCNC: <0.2 MG/DL (ref 0–1.2)
BUN SERPL-MCNC: 9 MG/DL (ref 8–23)
BUN/CREAT SERPL: 13.8 (ref 7–25)
CALCIUM SPEC-SCNC: 9.7 MG/DL (ref 8.6–10.5)
CHLORIDE SERPL-SCNC: 101 MMOL/L (ref 98–107)
CHOLEST SERPL-MCNC: 225 MG/DL (ref 0–200)
CO2 SERPL-SCNC: 21 MMOL/L (ref 22–29)
CREAT SERPL-MCNC: 0.65 MG/DL (ref 0.57–1)
DEPRECATED RDW RBC AUTO: 40.8 FL (ref 37–54)
EGFRCR SERPLBLD CKD-EPI 2021: 98.5 ML/MIN/1.73
EOSINOPHIL # BLD AUTO: 0 10*3/MM3 (ref 0–0.4)
EOSINOPHIL NFR BLD AUTO: 0 % (ref 0.3–6.2)
ERYTHROCYTE [DISTWIDTH] IN BLOOD BY AUTOMATED COUNT: 13.3 % (ref 12.3–15.4)
GLOBULIN UR ELPH-MCNC: 2.9 GM/DL
GLUCOSE SERPL-MCNC: 138 MG/DL (ref 65–99)
HBA1C MFR BLD: 5.3 % (ref 4.8–5.6)
HCT VFR BLD AUTO: 44.6 % (ref 34–46.6)
HDLC SERPL-MCNC: 53 MG/DL (ref 40–60)
HGB BLD-MCNC: 14.4 G/DL (ref 12–15.9)
IMM GRANULOCYTES # BLD AUTO: 0.06 10*3/MM3 (ref 0–0.05)
IMM GRANULOCYTES NFR BLD AUTO: 0.5 % (ref 0–0.5)
LDLC SERPL CALC-MCNC: 148 MG/DL (ref 0–100)
LDLC/HDLC SERPL: 2.73 {RATIO}
LYMPHOCYTES # BLD AUTO: 1.69 10*3/MM3 (ref 0.7–3.1)
LYMPHOCYTES NFR BLD AUTO: 12.8 % (ref 19.6–45.3)
MCH RBC QN AUTO: 26.7 PG (ref 26.6–33)
MCHC RBC AUTO-ENTMCNC: 32.3 G/DL (ref 31.5–35.7)
MCV RBC AUTO: 82.7 FL (ref 79–97)
MONOCYTES # BLD AUTO: 0.42 10*3/MM3 (ref 0.1–0.9)
MONOCYTES NFR BLD AUTO: 3.2 % (ref 5–12)
NEUTROPHILS NFR BLD AUTO: 11.07 10*3/MM3 (ref 1.7–7)
NEUTROPHILS NFR BLD AUTO: 83.4 % (ref 42.7–76)
PLATELET # BLD AUTO: 217 10*3/MM3 (ref 140–450)
PMV BLD AUTO: 11.4 FL (ref 6–12)
POTASSIUM SERPL-SCNC: 4.1 MMOL/L (ref 3.5–5.2)
PROT SERPL-MCNC: 7.5 G/DL (ref 6–8.5)
RBC # BLD AUTO: 5.39 10*6/MM3 (ref 3.77–5.28)
SODIUM SERPL-SCNC: 136 MMOL/L (ref 136–145)
TRIGL SERPL-MCNC: 136 MG/DL (ref 0–150)
VLDLC SERPL-MCNC: 24 MG/DL (ref 5–40)
WBC NRBC COR # BLD AUTO: 13.25 10*3/MM3 (ref 3.4–10.8)

## 2025-02-21 PROCEDURE — 83036 HEMOGLOBIN GLYCOSYLATED A1C: CPT | Performed by: INTERNAL MEDICINE

## 2025-02-21 PROCEDURE — 80061 LIPID PANEL: CPT

## 2025-02-21 PROCEDURE — 85025 COMPLETE CBC W/AUTO DIFF WBC: CPT | Performed by: INTERNAL MEDICINE

## 2025-02-21 PROCEDURE — 80053 COMPREHEN METABOLIC PANEL: CPT | Performed by: INTERNAL MEDICINE

## 2025-02-28 ENCOUNTER — PATIENT MESSAGE (OUTPATIENT)
Dept: FAMILY MEDICINE CLINIC | Age: 65
End: 2025-02-28
Payer: COMMERCIAL

## 2025-02-28 DIAGNOSIS — D72.829 LEUKOCYTOSIS, UNSPECIFIED TYPE: Primary | ICD-10-CM

## 2025-02-28 DIAGNOSIS — R79.89 ABNORMAL THYROID BLOOD TEST: ICD-10-CM

## 2025-03-04 ENCOUNTER — LAB (OUTPATIENT)
Dept: LAB | Facility: HOSPITAL | Age: 65
End: 2025-03-04
Payer: COMMERCIAL

## 2025-03-04 DIAGNOSIS — D72.829 LEUKOCYTOSIS, UNSPECIFIED TYPE: ICD-10-CM

## 2025-03-04 DIAGNOSIS — R79.89 ABNORMAL THYROID BLOOD TEST: ICD-10-CM

## 2025-03-04 LAB
BASOPHILS # BLD AUTO: 0.04 10*3/MM3 (ref 0–0.2)
BASOPHILS NFR BLD AUTO: 0.5 % (ref 0–1.5)
DEPRECATED RDW RBC AUTO: 43.3 FL (ref 37–54)
EOSINOPHIL # BLD AUTO: 0.15 10*3/MM3 (ref 0–0.4)
EOSINOPHIL NFR BLD AUTO: 1.7 % (ref 0.3–6.2)
ERYTHROCYTE [DISTWIDTH] IN BLOOD BY AUTOMATED COUNT: 13.9 % (ref 12.3–15.4)
HCT VFR BLD AUTO: 43.7 % (ref 34–46.6)
HGB BLD-MCNC: 13.9 G/DL (ref 12–15.9)
IMM GRANULOCYTES # BLD AUTO: 0.03 10*3/MM3 (ref 0–0.05)
IMM GRANULOCYTES NFR BLD AUTO: 0.3 % (ref 0–0.5)
LYMPHOCYTES # BLD AUTO: 2.3 10*3/MM3 (ref 0.7–3.1)
LYMPHOCYTES NFR BLD AUTO: 26.8 % (ref 19.6–45.3)
MCH RBC QN AUTO: 26.9 PG (ref 26.6–33)
MCHC RBC AUTO-ENTMCNC: 31.8 G/DL (ref 31.5–35.7)
MCV RBC AUTO: 84.7 FL (ref 79–97)
MONOCYTES # BLD AUTO: 0.8 10*3/MM3 (ref 0.1–0.9)
MONOCYTES NFR BLD AUTO: 9.3 % (ref 5–12)
NEUTROPHILS NFR BLD AUTO: 5.27 10*3/MM3 (ref 1.7–7)
NEUTROPHILS NFR BLD AUTO: 61.4 % (ref 42.7–76)
PLATELET # BLD AUTO: 186 10*3/MM3 (ref 140–450)
PMV BLD AUTO: 10.6 FL (ref 6–12)
RBC # BLD AUTO: 5.16 10*6/MM3 (ref 3.77–5.28)
T3FREE SERPL-MCNC: 2.76 PG/ML (ref 2–4.4)
T4 FREE SERPL-MCNC: 0.95 NG/DL (ref 0.92–1.68)
TSH SERPL DL<=0.05 MIU/L-ACNC: 1.34 UIU/ML (ref 0.27–4.2)
WBC NRBC COR # BLD AUTO: 8.59 10*3/MM3 (ref 3.4–10.8)

## 2025-03-04 PROCEDURE — 84481 FREE ASSAY (FT-3): CPT

## 2025-03-04 PROCEDURE — 36415 COLL VENOUS BLD VENIPUNCTURE: CPT

## 2025-03-04 PROCEDURE — 84439 ASSAY OF FREE THYROXINE: CPT

## 2025-03-04 PROCEDURE — 85025 COMPLETE CBC W/AUTO DIFF WBC: CPT

## 2025-03-04 PROCEDURE — 84443 ASSAY THYROID STIM HORMONE: CPT

## 2025-03-31 RX ORDER — ACYCLOVIR 400 MG/1
400 TABLET ORAL 3 TIMES DAILY
Qty: 20 TABLET | Refills: 3 | Status: SHIPPED | OUTPATIENT
Start: 2025-03-31

## 2025-04-18 ENCOUNTER — OFFICE VISIT (OUTPATIENT)
Dept: GASTROENTEROLOGY | Facility: CLINIC | Age: 65
End: 2025-04-18
Payer: COMMERCIAL

## 2025-04-18 VITALS
BODY MASS INDEX: 33.83 KG/M2 | TEMPERATURE: 97.8 F | SYSTOLIC BLOOD PRESSURE: 128 MMHG | HEART RATE: 65 BPM | HEIGHT: 60 IN | DIASTOLIC BLOOD PRESSURE: 78 MMHG | WEIGHT: 172.3 LBS

## 2025-04-18 DIAGNOSIS — K76.0 HEPATIC STEATOSIS: Primary | ICD-10-CM

## 2025-04-18 DIAGNOSIS — K58.2 IRRITABLE BOWEL SYNDROME WITH BOTH CONSTIPATION AND DIARRHEA: ICD-10-CM

## 2025-04-18 NOTE — PROGRESS NOTES
"Chief Complaint  Irritable Bowel Syndrome    Subjective          History of Present Illness    Mine Palm is a  64 y.o. female presents for follow-up on IBS-C.  She is a former patient of Dr. Mane last seen by me on 12/5/2023.    She has a history of IBS with mostly constipation.  Today she reports she is stable with her Bms--daily normal stools. She follows diet with [employer]. She tried Semaglutide but didn't lose weight--tolerated well. Maintaining weight for the last year. Denies abdominal pain, nausea, vomiting, hematochezia.     Hx of fatty liver. 2/21/25 normal LFTs. Eats healthy--avoiding fried foods.     From 12/5/2023 office note:  7/12/2023 gastric emptying scan showed mild delay at 3 hours with 36% but this resolved with 6% food remaining at 4 hours.  T1/2 is 134 minutes.     7/12/2023 ultrasound showed diffusely heterogeneous and coarsened hepatic echotexture likely related to hepatic steatosis.  LFTs the same day were normal.     Colonoscopy on 1/4/2022 showed 4 colon polyps, diverticulosis, nonbleeding internal hemorrhoids.  Pathology showed tubular adenomas and hyperplastic changes.  Random colon biopsies unremarkable.  Recall 5 years.     EGD at the same time showed mild gastritis, sliding hiatal hernia, irregular Z-line.  Pathology showed focal mild nonspecific peptic duodenitis, negative stomach biopsies.    Objective   Vital Signs:   /78   Pulse 65   Temp 97.8 °F (36.6 °C)   Ht 152.4 cm (60\")   Wt 78.2 kg (172 lb 4.8 oz)   BMI 33.65 kg/m²       Physical Exam  Vitals reviewed.   Constitutional:       General: She is awake. She is not in acute distress.     Appearance: Normal appearance. She is well-developed and well-groomed.   HENT:      Head: Normocephalic.   Pulmonary:      Effort: Pulmonary effort is normal. No respiratory distress.   Skin:     Coloration: Skin is not pale.   Neurological:      Mental Status: She is alert and oriented to person, place, and time. "      Gait: Gait is intact.   Psychiatric:         Mood and Affect: Mood and affect normal.         Speech: Speech normal.         Behavior: Behavior is cooperative.         Judgment: Judgment normal.          Result Review :             Assessment and Plan    Diagnoses and all orders for this visit:    1. Hepatic steatosis (Primary)  -     US Elastography Parenchyma  -     US Liver  -     US Derived Fat Fraction    2. Irritable bowel syndrome with both constipation and diarrhea    Will check elastography as above for history of fatty liver to assess fibrosis score.  Continue to work on weight loss, low-fat diet, increased exercise.    Continue lifestyle changes/diet for IBS,     Follow Up   Return in about 1 year (around 4/18/2026).    Dragon dictation used throughout this note.     Judy Shah PA-C

## 2025-07-08 ENCOUNTER — OFFICE VISIT (OUTPATIENT)
Dept: FAMILY MEDICINE CLINIC | Age: 65
End: 2025-07-08
Payer: COMMERCIAL

## 2025-07-08 VITALS
DIASTOLIC BLOOD PRESSURE: 80 MMHG | WEIGHT: 178 LBS | HEART RATE: 68 BPM | HEIGHT: 60 IN | TEMPERATURE: 97.4 F | BODY MASS INDEX: 34.95 KG/M2 | SYSTOLIC BLOOD PRESSURE: 136 MMHG | OXYGEN SATURATION: 97 %

## 2025-07-08 DIAGNOSIS — G89.29 CHRONIC NONINTRACTABLE HEADACHE, UNSPECIFIED HEADACHE TYPE: Primary | ICD-10-CM

## 2025-07-08 DIAGNOSIS — R51.9 CHRONIC NONINTRACTABLE HEADACHE, UNSPECIFIED HEADACHE TYPE: Primary | ICD-10-CM

## 2025-07-08 DIAGNOSIS — G47.00 INSOMNIA, UNSPECIFIED TYPE: ICD-10-CM

## 2025-07-08 PROCEDURE — 99213 OFFICE O/P EST LOW 20 MIN: CPT | Performed by: NURSE PRACTITIONER

## 2025-07-08 NOTE — PROGRESS NOTES
Chief Complaint  Migraine (3 month follow up - declined to schedule next appt )    Subjective          Mine Palm presents to [facility]     Patient is a 64-year-old female who is here today for a follow-up with her PCP is out on medical leave.  She saw PCP in February and was due to have labs repeated in August.  She will be having a recheck of her lipid panel due to hyperlipidemia and rechecking thyroid as one of her thyroid labs was low range of normal on March 4.  Patient would like to get medication to help with weight loss.  She previously had no results on a compounded semaglutide.  Advised that unfortunately insurance is not covering weight loss medications at this time.  We discussed lifestyle measures to help with weight loss and she will have her thyroid function rechecked in August.    Has had chronic headaches for many years and definitely has more than 15/month.  Also with delayed onset of sleep and frequent awakenings.  Discussed that she could possibly consider amitriptyline taken at bedtime to help with sleep and reduce the frequencies of headaches.  We discussed multiple headache triggers.  She does have some associated neck pain and had a cervical spine x-ray in February as well as CT imaging of the head that was negative for acute findings.  Most often when her headaches are becoming more frequent she goes to [facility] for an adjustment and it improves.  She is previously lost 35 pounds with use of Optavia.  She takes Tylenol virtually on a daily basis for headaches.  Is hesitant to add any additional medications due to her chronic IBS that is stable and very easily aggravated with additions of medications.  Patient defers vaccines at this time.  She regularly sees a gynecologist who ordered breast cancer screenings.     Objective   Vital Signs:   Vitals:    07/08/25 1416 07/08/25 1425   BP: 146/60 136/80   BP Location: Left arm Right arm  "  Patient Position: Sitting Sitting   Cuff Size: Adult Adult   Pulse: 68    Temp: 97.4 °F (36.3 °C)    TempSrc: Temporal    SpO2: 97%    Weight: 80.7 kg (178 lb)    Height: 152.4 cm (60\")        Wt Readings from Last 3 Encounters:   07/08/25 80.7 kg (178 lb)   04/18/25 78.2 kg (172 lb 4.8 oz)   02/03/25 79.1 kg (174 lb 6.4 oz)      BP Readings from Last 3 Encounters:   07/08/25 136/80   04/18/25 128/78   02/03/25 144/71       Body mass index is 34.76 kg/m².             Physical Exam  Vitals reviewed.   Constitutional:       General: She is not in acute distress.     Appearance: Normal appearance. She is well-developed. She is obese.   Neck:      Thyroid: No thyromegaly.      Vascular: No carotid bruit.   Cardiovascular:      Rate and Rhythm: Normal rate and regular rhythm.      Heart sounds: Normal heart sounds.   Pulmonary:      Effort: Pulmonary effort is normal.      Breath sounds: Normal breath sounds.   Musculoskeletal:      Right lower leg: No edema.      Left lower leg: No edema.   Skin:     General: Skin is warm and dry.   Neurological:      General: No focal deficit present.      Mental Status: She is alert.   Psychiatric:         Attention and Perception: Attention normal.         Mood and Affect: Mood and affect normal.         Behavior: Behavior normal.           Current Outpatient Medications:     acyclovir (ZOVIRAX) 400 MG tablet, Take 1 tablet by mouth 3 (Three) Times a Day. Take no more than 5 doses a day., Disp: 20 tablet, Rfl: 3    Ascorbic Acid (VITAMIN C PO), Take  by mouth Daily., Disp: , Rfl:     Progesterone (PROMETRIUM) 200 MG capsule, Take 1 capsule by mouth every night at bedtime., Disp: , Rfl:    Past Medical History:   Diagnosis Date    Colon polyp removed 1/4/22    Constipation     GERD (gastroesophageal reflux disease) 2021    Hepatic steatosis     Hernia 1/4/22    Irritable bowel syndrome 2019    Lactose intolerance     PONV (postoperative nausea and vomiting)      Allergies   Allergen " Reactions    Tape Rash               Result Review :     Common labs          2/21/2025    08:43 3/4/2025    09:47   Common Labs   Glucose 138     BUN 9     Creatinine 0.65     Sodium 136     Potassium 4.1     Chloride 101     Calcium 9.7     Albumin 4.6     Total Bilirubin <0.2     Alkaline Phosphatase 28     AST (SGOT) 18     ALT (SGPT) 16     WBC 13.25  8.59    Hemoglobin 14.4  13.9    Hematocrit 44.6  43.7    Platelets 217  186    Total Cholesterol 225     Triglycerides 136     HDL Cholesterol 53     LDL Cholesterol  148     Hemoglobin A1C 5.30          No Images in the past 120 days found..           Social History     Tobacco Use   Smoking Status Never   Smokeless Tobacco Never           Assessment and Plan    Diagnoses and all orders for this visit:    1. Chronic nonintractable headache, unspecified headache type (Primary)  Assessment & Plan:  Handout provided on amitriptyline.  Patient to let me know if she would like to start use of amitriptyline.  Could also consider MRI of the cervical spine and head.  Could also see neurology.      2. Insomnia, unspecified type        Follow Up    Return if symptoms worsen or fail to improve.  Patient was given instructions and counseling regarding her condition or for health maintenance advice. Please see specific information pulled into the AVS if appropriate.

## 2025-07-08 NOTE — ASSESSMENT & PLAN NOTE
Handout provided on amitriptyline.  Patient to let me know if she would like to start use of amitriptyline.  Could also consider MRI of the cervical spine and head.  Could also see neurology.

## (undated) DEVICE — KT ORCA ORCAPOD DISP STRL

## (undated) DEVICE — THE SINGLE USE ETRAP – POLYP TRAP IS USED FOR SUCTION RETRIEVAL OF ENDOSCOPICALLY REMOVED POLYPS.: Brand: ETRAP

## (undated) DEVICE — SENSR O2 OXIMAX FNGR A/ 18IN NONSTR

## (undated) DEVICE — SNAR POLYP CAPTIVATOR RND STFF 2.4 240CM 10MM 1P/U

## (undated) DEVICE — TUBING, SUCTION, 1/4" X 10', STRAIGHT: Brand: MEDLINE

## (undated) DEVICE — MSK PROC CURAPLEX O2 2/ADAPT 7FT

## (undated) DEVICE — LN SMPL CO2 SHTRM SD STREAM W/M LUER

## (undated) DEVICE — ADAPT CLN BIOGUARD AIR/H2O DISP

## (undated) DEVICE — FRCP BX RADJAW4 NDL 2.8 240CM LG OG BX40

## (undated) DEVICE — BITEBLOCK OMNI BLOC